# Patient Record
Sex: FEMALE | Race: WHITE | NOT HISPANIC OR LATINO | Employment: OTHER | URBAN - NONMETROPOLITAN AREA
[De-identification: names, ages, dates, MRNs, and addresses within clinical notes are randomized per-mention and may not be internally consistent; named-entity substitution may affect disease eponyms.]

---

## 2017-03-08 ENCOUNTER — LAB REQUISITION (OUTPATIENT)
Dept: LAB | Facility: HOSPITAL | Age: 62
End: 2017-03-08

## 2017-03-08 DIAGNOSIS — Z00.00 ENCOUNTER FOR GENERAL ADULT MEDICAL EXAMINATION WITHOUT ABNORMAL FINDINGS: ICD-10-CM

## 2017-03-08 PROCEDURE — 87086 URINE CULTURE/COLONY COUNT: CPT | Performed by: INTERNAL MEDICINE

## 2017-03-10 ENCOUNTER — HOSPITAL ENCOUNTER (OUTPATIENT)
Dept: GENERAL RADIOLOGY | Age: 62
Discharge: HOME | End: 2017-03-10
Payer: COMMERCIAL

## 2017-03-10 DIAGNOSIS — R10.84 ABDOMINAL PAIN, COLICKY: ICD-10-CM

## 2017-03-10 LAB — BACTERIA SPEC AEROBE CULT: ABNORMAL

## 2017-03-10 PROCEDURE — 74000 XR ABDOMEN LIMITED (KUB): CPT

## 2017-07-14 RX ORDER — LEVOTHYROXINE SODIUM 0.07 MG/1
75 TABLET ORAL DAILY
Qty: 90 TABLET | Refills: 3 | Status: SHIPPED | OUTPATIENT
Start: 2017-07-14 | End: 2017-12-08 | Stop reason: DRUGHIGH

## 2017-08-28 DIAGNOSIS — E55.9 AVITAMINOSIS D: ICD-10-CM

## 2017-08-28 DIAGNOSIS — E03.9 ACQUIRED HYPOTHYROIDISM: ICD-10-CM

## 2017-08-28 DIAGNOSIS — E78.2 MIXED HYPERLIPIDEMIA: Primary | ICD-10-CM

## 2017-09-27 DIAGNOSIS — E78.2 MIXED HYPERLIPIDEMIA: ICD-10-CM

## 2017-09-27 DIAGNOSIS — E55.9 AVITAMINOSIS D: ICD-10-CM

## 2017-09-27 DIAGNOSIS — E03.9 ACQUIRED HYPOTHYROIDISM: ICD-10-CM

## 2017-09-27 LAB
ALBUMIN SERPL-MCNC: 4.3 G/DL (ref 3.5–5.2)
ALP BLD-CCNC: 85 U/L (ref 35–104)
ALT SERPL-CCNC: 16 U/L (ref 5–33)
ANION GAP SERPL CALCULATED.3IONS-SCNC: 14 MMOL/L (ref 7–19)
AST SERPL-CCNC: 17 U/L (ref 5–32)
BILIRUB SERPL-MCNC: 0.5 MG/DL (ref 0.2–1.2)
BUN BLDV-MCNC: 17 MG/DL (ref 8–23)
CALCIUM SERPL-MCNC: 9.4 MG/DL (ref 8.8–10.2)
CHLORIDE BLD-SCNC: 106 MMOL/L (ref 98–111)
CHOLESTEROL, TOTAL: 212 MG/DL (ref 160–199)
CO2: 25 MMOL/L (ref 22–29)
CREAT SERPL-MCNC: 0.7 MG/DL (ref 0.5–0.9)
GFR NON-AFRICAN AMERICAN: >60
GLUCOSE BLD-MCNC: 82 MG/DL (ref 74–109)
HDLC SERPL-MCNC: 45 MG/DL (ref 65–121)
LDL CHOLESTEROL CALCULATED: 129 MG/DL
POTASSIUM SERPL-SCNC: 3.9 MMOL/L (ref 3.5–5)
SODIUM BLD-SCNC: 145 MMOL/L (ref 136–145)
TOTAL PROTEIN: 7.8 G/DL (ref 6.6–8.7)
TRIGL SERPL-MCNC: 188 MG/DL (ref 150–199)
TSH SERPL DL<=0.05 MIU/L-ACNC: 3.02 UIU/ML (ref 0.27–4.2)
VITAMIN D 25-HYDROXY: 38.5 NG/ML

## 2017-10-04 ENCOUNTER — OFFICE VISIT (OUTPATIENT)
Dept: FAMILY MEDICINE CLINIC | Age: 62
End: 2017-10-04
Payer: COMMERCIAL

## 2017-10-04 VITALS
DIASTOLIC BLOOD PRESSURE: 68 MMHG | WEIGHT: 173 LBS | RESPIRATION RATE: 18 BRPM | TEMPERATURE: 98.5 F | HEART RATE: 74 BPM | HEIGHT: 63 IN | OXYGEN SATURATION: 98 % | SYSTOLIC BLOOD PRESSURE: 110 MMHG | BODY MASS INDEX: 30.65 KG/M2

## 2017-10-04 DIAGNOSIS — Z23 FLU VACCINE NEED: ICD-10-CM

## 2017-10-04 DIAGNOSIS — E66.09 NON MORBID OBESITY DUE TO EXCESS CALORIES: ICD-10-CM

## 2017-10-04 DIAGNOSIS — K58.2 IRRITABLE BOWEL SYNDROME WITH BOTH CONSTIPATION AND DIARRHEA: ICD-10-CM

## 2017-10-04 DIAGNOSIS — E55.9 AVITAMINOSIS D: ICD-10-CM

## 2017-10-04 DIAGNOSIS — F41.1 GAD (GENERALIZED ANXIETY DISORDER): ICD-10-CM

## 2017-10-04 DIAGNOSIS — E78.2 MIXED HYPERLIPIDEMIA: ICD-10-CM

## 2017-10-04 DIAGNOSIS — M25.50 ARTHRALGIA, UNSPECIFIED JOINT: ICD-10-CM

## 2017-10-04 DIAGNOSIS — M79.10 MYALGIA: ICD-10-CM

## 2017-10-04 DIAGNOSIS — E03.9 ACQUIRED HYPOTHYROIDISM: Primary | ICD-10-CM

## 2017-10-04 PROCEDURE — 99214 OFFICE O/P EST MOD 30 MIN: CPT | Performed by: INTERNAL MEDICINE

## 2017-10-04 PROCEDURE — 90471 IMMUNIZATION ADMIN: CPT | Performed by: INTERNAL MEDICINE

## 2017-10-04 PROCEDURE — 90630 INFLUENZA, QUADV, 18-64 YRS, ID, PF, MICRO INJ, 0.1ML (FLUZONE QUADV, PF): CPT | Performed by: INTERNAL MEDICINE

## 2017-10-04 RX ORDER — DULOXETIN HYDROCHLORIDE 20 MG/1
20 CAPSULE, DELAYED RELEASE ORAL DAILY
Qty: 30 CAPSULE | Refills: 2 | Status: SHIPPED | OUTPATIENT
Start: 2017-10-04 | End: 2017-12-08

## 2017-10-04 RX ORDER — CHOLECALCIFEROL (VITAMIN D3) 1250 MCG
50000 CAPSULE ORAL WEEKLY
COMMUNITY
End: 2018-03-08 | Stop reason: SDUPTHER

## 2017-10-04 RX ORDER — NAPROXEN 500 MG/1
500 TABLET ORAL 2 TIMES DAILY WITH MEALS
COMMUNITY
End: 2017-12-08 | Stop reason: ALTCHOICE

## 2017-10-04 ASSESSMENT — ENCOUNTER SYMPTOMS
SORE THROAT: 0
BLOOD IN STOOL: 0
EYE REDNESS: 0
SINUS PRESSURE: 0
SHORTNESS OF BREATH: 0
COLOR CHANGE: 0
VOICE CHANGE: 0
DIARRHEA: 0
WHEEZING: 0
COUGH: 0
ROS SKIN COMMENTS: SEE HPI
EYE PAIN: 0
VOMITING: 0
ABDOMINAL PAIN: 0
EYE DISCHARGE: 0
RHINORRHEA: 0
CONSTIPATION: 1
CHEST TIGHTNESS: 0

## 2017-10-04 NOTE — PROGRESS NOTES
After obtaining consent, and per orders of Dr. Mela Kirby, injection of Influenza given in Left arm by [place]. Patient instructed to remain in clinic for 20 minutes afterwards, and to report any adverse reaction to me immediately.

## 2017-10-04 NOTE — PROGRESS NOTES
Negative for cold intolerance and polydipsia. Genitourinary: Negative for dysuria and hematuria. Musculoskeletal: Positive for arthralgias and myalgias. Negative for neck pain and neck stiffness. Skin: Positive for rash. Negative for color change. See HPI   Neurological: Negative for dizziness, weakness, numbness and headaches. Hematological: Negative for adenopathy. Psychiatric/Behavioral: Negative for confusion and sleep disturbance. Past Medical History:   Diagnosis Date    Acquired hypothyroidism     Allergic rhinitis     Alopecia     Avitaminosis D     Cervical radiculopathy     Gastritis     Idiopathic insomnia     Irritable bowel syndrome with both constipation and diarrhea     Mixed hyperlipidemia     Trochanteric bursitis of both hips     Venous insufficiency        Current Outpatient Prescriptions   Medication Sig Dispense Refill    naproxen (NAPROSYN) 500 MG tablet Take 500 mg by mouth 2 times daily (with meals)      Probiotic Product (PROBIOTIC DAILY PO) Take by mouth      Flaxseed, Linseed, (FLAX SEEDS PO) Take by mouth      Cholecalciferol (VITAMIN D3) 60120 units CAPS Take 10,000 each by mouth       DULoxetine (CYMBALTA) 20 MG extended release capsule Take 1 capsule by mouth daily 30 capsule 2    levothyroxine (SYNTHROID) 75 MCG tablet Take 1 tablet by mouth Daily 90 tablet 3     No current facility-administered medications for this visit.         No Known Allergies    Past Surgical History:   Procedure Laterality Date    ARTHRODESIS      ARTHROPLASTY      CHOLECYSTECTOMY      DILATION AND CURETTAGE OF UTERUS      HEMORRHOID SURGERY      HYSTERECTOMY      TUBAL LIGATION         Social History   Substance Use Topics    Smoking status: Never Smoker    Smokeless tobacco: None    Alcohol use No       Family History   Problem Relation Age of Onset    High Blood Pressure Mother        /68  Pulse 74  Temp 98.5 °F (36.9 °C) (Temporal)   Resp 18  Ht 5' 3\" (1.6 m)  Wt 173 lb (78.5 kg)  SpO2 98%  BMI 30.65 kg/m2    Physical Exam   Constitutional: She is oriented to person, place, and time. No distress. Eyes: Conjunctivae and EOM are normal. Pupils are equal, round, and reactive to light. Neck: Neck supple. No JVD present. No thyromegaly present. Cardiovascular: Normal rate, regular rhythm, normal heart sounds and intact distal pulses. Exam reveals no gallop and no friction rub. No murmur heard. Pulmonary/Chest: Effort normal and breath sounds normal.   Abdominal: Soft. Bowel sounds are normal. She exhibits no distension. There is no tenderness. Musculoskeletal: She exhibits no edema or tenderness. Lymphadenopathy:     She has no cervical adenopathy. Neurological: She is alert and oriented to person, place, and time. Skin: Skin is warm. No rash noted. Diffuse petechial rash BLE mid pre-tibial area to ankles BLE. Psychiatric: Her behavior is normal. Judgment and thought content normal. Her mood appears anxious. Lab Results   Component Value Date    CHOL 212 (H) 09/27/2017    TRIG 188 09/27/2017    HDL 45 (L) 09/27/2017    ALT 16 09/27/2017    AST 17 09/27/2017     09/27/2017    K 3.9 09/27/2017     09/27/2017    CREATININE 0.7 09/27/2017    BUN 17 09/27/2017    CO2 25 09/27/2017    TSH 3.020 09/27/2017     Lab Results   Component Value Date    LDLCALC 129 09/27/2017     This SmartLink has not been configured with any valid records. Assessment:    ICD-10-CM ICD-9-CM    1. Acquired hypothyroidism E03.9 244.9    2. Mixed hyperlipidemia E78.2 272.2    3. Avitaminosis D E55.9 268.9    4. Irritable bowel syndrome with both constipation and diarrhea K58.2 564.1    5. Arthralgia, unspecified joint M25.50 719.40 External Referral To Rheumatology   6. Myalgia M79.1 729.1 External Referral To Rheumatology   7. BERTRAND (generalized anxiety disorder) F41.1 300.02 DULoxetine (CYMBALTA) 20 MG extended release capsule   8.  Flu vaccine need Z23 V04.81 INFLUENZA, QUADV, 18-64 YRS, ID, PF, MICRO INJ, 0.1ML (FLUZONE QUADV, PF)   9. Non morbid obesity due to excess calories E66.09 278.00        Plan:  Margret Erazo was seen today for 6 month follow-up. Diagnoses and all orders for this visit:    Acquired hypothyroidism    Mixed hyperlipidemia    Avitaminosis D    Irritable bowel syndrome with both constipation and diarrhea    Arthralgia, unspecified joint  -     External Referral To Rheumatology    Myalgia  -     External Referral To Rheumatology    BERTRAND (generalized anxiety disorder)  -     DULoxetine (CYMBALTA) 20 MG extended release capsule; Take 1 capsule by mouth daily    Flu vaccine need  -     INFLUENZA, QUADV, 18-64 YRS, ID, PF, MICRO INJ, 0.1ML (FLUZONE QUADV, PF)    Non morbid obesity due to excess calories     1. Labs reviewed with patient  2. Refills provided  3. Influenza vaccine updated today  4. Avitaminosis D is well controlled at this time on review of recent labs. Continue current vitamin D supplement. 5. Continue low-cholesterol diet and exercise for mixed hyperlipidemia and obesity which are improving  6. Discussed petechial rash on her bilateral lower extremities with Dr. Elvin Badillo with rheumatology to determine if she needed to have workup for a possible vasculitis that could be causing this rash. Given the rashes been present for at least 6 months, he feels this is less likely but given there is also a question of fibromyalgia versus other cause of diffuse achiness we will refer her to rheumatology for further lab work and evaluation. 7. Given she also has anxiety, will try duloxetine 20 mg once daily given this would help fibromyalgia pain or other chronic pain syndromes in some patients. 8. Follow up in 6 weeks to recheck me. Subsequent to contact office prior to appointment if she has further questions.     Orders Placed This Encounter   Procedures    INFLUENZA, QUADV, 18-64 YRS, ID, PF, MICRO INJ, 0.1ML (FLUZONE QUADV, PF)    External Referral To Rheumatology     Referral Priority:   Routine     Referral Type:   Consult for Advice and Opinion     Referral Reason:   Specialty Services Required     Requested Specialty:   Rheumatology     Number of Visits Requested:   1     Orders Placed This Encounter   Medications    DULoxetine (CYMBALTA) 20 MG extended release capsule     Sig: Take 1 capsule by mouth daily     Dispense:  30 capsule     Refill:  2     There are no discontinued medications. Patient Instructions        Fibromyalgia: Care Instructions  Your Care Instructions  Fibromyalgia is a painful condition that is not completely understood by medical experts. The cause of fibromyalgia is not known. It can make you feel tired and ache all over. It causes tender spots at specific points of the body that hurt only when you press on them. You may have trouble sleeping, as well as other symptoms. These problems can upset your work and home life. Symptoms tend to come and go, although they may never go away completely. Fibromyalgia does not harm your muscles, joints, or organs. Follow-up care is a key part of your treatment and safety. Be sure to make and go to all appointments, and call your doctor if you are having problems. It's also a good idea to know your test results and keep a list of the medicines you take. How can you care for yourself at home? · Exercise often. Walk, swim, or bike to help with pain and sleep problems and to make you feel better. · Try to get a good night's sleep. Go to bed and get up at the same time each day, whether you feel rested or not. Make sure you have a good mattress and pillow. · Reduce stress. Avoid things that cause you stress, if you can. If not, work at making them less stressful. Learn to use biofeedback, guided imagery, meditation, or other methods to relax. · Make healthy changes. Eat a balanced diet, quit smoking, and limit alcohol and caffeine.   · Use a heating pad set on low or take warm baths or showers for pain. Using cold packs for up to 20 minutes at a time can also relieve pain. Put a thin cloth between the cold pack and your skin. A gentle massage might help too. · Be safe with medicines. Take your medicines exactly as prescribed. Call your doctor if you think you are having a problem with your medicine. Your doctor may talk to you about taking antidepressant medicines. These medicines may improve sleep, relieve pain, and in some cases treat depression. · Learn about fibromyalgia. This makes coping easier. Then, take an active role in your treatment. · Think about joining a support group with others who have fibromyalgia to learn more and get support. When should you call for help? Watch closely for changes in your health, and be sure to contact your doctor if:  · You feel sad, helpless, or hopeless; lose interest in things you used to enjoy; or have other symptoms of depression. · Your fibromyalgia symptoms get worse. Where can you learn more? Go to [email or web address] and sign in to your Playchemy account. Enter V003 in the Dinglepharb box to learn more about \"Fibromyalgia: Care Instructions. \"     If you do not have an account, please click on the \"Sign Up Now\" link. Current as of: October 14, 2016  Content Version: 11.3  © 6221-3178 [employer]. Care instructions adapted under license by [facility] ([facility]). If you have questions about a medical condition or this instruction, always ask your healthcare professional. [place] [address] any warranty or liability for your use of this information. Patient voices understanding and agrees to plans along with risks and benefits of plan. Counseling:  Margret Galvez's case, medications and options were discussed in detail. Patient was instructed to call the office if she   questions regarding her treatment.  Should her conditions worsen, she should return to office to be reassessed by Dr. Mary Carmen Smith. she  Should to go the closest Emergency Department for any emergency. They verbalized understanding the above instructions. Return in about 6 weeks (around 11/15/2017) for recheck mood.

## 2017-10-04 NOTE — MR AVS SNAPSHOT
After Visit Summary             Hector Kumar   10/4/2017 2:00 PM   Office Visit    Description:  Female : 1955   Provider:  Sander Nicolas MD   Department:  [facility]              Your Follow-Up and Future Appointments         Below is a list of your follow-up and future appointments. This may not be a complete list as you may have made appointments directly with providers that we are not aware of or your providers may have made some for you. Please call your providers to confirm appointments. It is important to keep your appointments. Please bring your current insurance card, photo ID, co-pay, and all medication bottles to your appointment. If self-pay, payment is expected at the time of service. Your To-Do List     Future Appointments Provider Department Dept Phone    11/15/2017 4:00 PM Sander Nicolas MD [address] [phone]    Please arrive 15 minutes prior to appointment time, bring insurance card and photo ID. Follow-Up    Return in about 6 weeks (around 11/15/2017) for recheck mood. Information from Your Visit        Department     Name Address Phone Fax    [address] [address] [address] [phone]      You Were Seen for:         Comments    Acquired hypothyroidism   [705958]         Vital Signs     Blood Pressure Pulse Temperature Respirations Height Weight    110/68 74 98.5 °F (36.9 °C) (Temporal) 18 5' 3\" (1.6 m) 173 lb (78.5 kg)    Oxygen Saturation Body Mass Index Smoking Status             98% 30.65 kg/m2 Never Smoker         Additional Information about your Body Mass Index (BMI)           Your BMI as listed above is considered obese (30 or more). BMI is an estimate of body fat, calculated from your height and weight.   The higher your BMI, the greater your risk of heart disease, high blood pressure, type 2 diabetes, stroke, gallstones, arthritis, sleep apnea, and certain Refills:  2   Started by:  Kelby Garrett MD            Where to Get Your Medications      These medications were sent to [facility], [facility] [phone] - F [phone]  [address] [address]    Hours:  24-hours Phone:  [phone]     DULoxetine 20 MG extended release capsule               Your Current Medications Are              naproxen (NAPROSYN) 500 MG tablet Take 500 mg by mouth 2 times daily (with meals)    Probiotic Product (PROBIOTIC DAILY PO) Take by mouth    Flaxseed, Linseed, (FLAX SEEDS PO) Take by mouth    Cholecalciferol (VITAMIN D3) 27097 units CAPS Take 10,000 each by mouth     DULoxetine (CYMBALTA) 20 MG extended release capsule Take 1 capsule by mouth daily    levothyroxine (SYNTHROID) 75 MCG tablet Take 1 tablet by mouth Daily      Allergies           No Known Allergies      We Ordered/Performed the following           External Referral To Rheumatology     Comments: The patient can be scheduled with any member of the group, including the provider with the first available appointments. Additional Information        Basic Information     Date Of Birth Sex Race Ethnicity Preferred Language    1955 Female White Non-/Non  English      Problem List as of 10/4/2017  Date Reviewed: 10/4/2017                Acquired hypothyroidism    Mixed hyperlipidemia    Avitaminosis D    Irritable bowel syndrome with both constipation and diarrhea    Arthralgia    Myalgia    BERTRAND (generalized anxiety disorder)      Preventive Care        Date Due    Hepatitis C screening is recommended for all adults regardless of risk factors born between [facility] at least once (lifetime) who have never been tested. 1955    HIV screening is recommended for all people regardless of risk factors  aged 15-65 years at least once (lifetime) who have never been HIV tested.  8/27/1968    Tetanus Combination Vaccine (1 - Tdap) 8/27/1972 Pap Smear 8/27/1974    Mammograms are recommended every 2 years for low/average risk patients aged 48 - 69, and every year for high risk patients per updated national guidelines. However these guidelines can be individualized by your provider. 8/27/2003    Colonoscopy 8/27/2003    Zoster Vaccine 8/27/2013    Yearly Flu Vaccine (1) 9/1/2017    Cholesterol Screening 9/27/2022            MyChart Signup           [employer] allows you to send messages to your doctor, view your test results, renew your prescriptions, schedule appointments, view visit notes, and more. How Do I Sign Up? 1. In your Internet browser, go to [email or web address]  2. Click on the Sign Up Now link in the Sign In box. You will see the New Member Sign Up page. 3. Enter your [employer] Access Code exactly as it appears below. You will not need to use this code after youve completed the sign-up process. If you do not sign up before the expiration date, you must request a new code. [employer] Access Code: [email or web address]  Expires: 12/3/2017  3:18 PM    4. Enter your Social Security Number ([id number]) and Date of Birth (mm/dd/yyyy) as indicated and click Submit. You will be taken to the next sign-up page. 5. Create a [facility] ID. This will be your [employer] login ID and cannot be changed, so think of one that is secure and easy to remember. 6. Create a [employer] password. You can change your password at any time. 7. Enter your Password Reset Question and Answer. This can be used at a later time if you forget your password. 8. Enter your e-mail address. You will receive e-mail notification when new information is available in [address] 9. Click Sign Up. You can now view your medical record. Additional Information  If you have questions, please contact the physician practice where you receive care. Remember, [employer] is NOT to be used for urgent needs. For medical emergencies, dial 911.

## 2017-10-12 RX ORDER — ERGOCALCIFEROL 1.25 MG/1
CAPSULE ORAL
Qty: 24 CAPSULE | Refills: 2 | Status: SHIPPED | OUTPATIENT
Start: 2017-10-12 | End: 2017-12-08 | Stop reason: SDUPTHER

## 2017-10-13 ENCOUNTER — TELEPHONE (OUTPATIENT)
Dept: FAMILY MEDICINE CLINIC | Age: 62
End: 2017-10-13

## 2017-10-13 DIAGNOSIS — F41.9 ANXIETY: Primary | ICD-10-CM

## 2017-10-13 NOTE — TELEPHONE ENCOUNTER
Pt called and states you started her on Cymbalta as a trial dose. Pt is unable to tolerate the medication. Pt states she is unable to sleep very irritable and sick at her stomach. Pt advised Dr was out this week but I would send message and she would get a call back on Monday. Please advise.

## 2017-10-16 NOTE — TELEPHONE ENCOUNTER
Up all night no sleep first day trying it, nausea early morning and had a very bad day next day, she was dizzy, irritable the next day at work and in a fog has not taken since. Since not taking it she has been taking half benadryl at night to sleep and that works. She just wants to do this for now.

## 2017-10-17 NOTE — TELEPHONE ENCOUNTER
Left vm to pt that if she wanted she could try Buspar since she was not tolerating the Cymbalta.  I gave her the office number to call back if she is interested in trying this

## 2017-10-18 RX ORDER — BUSPIRONE HYDROCHLORIDE 5 MG/1
5 TABLET ORAL 3 TIMES DAILY PRN
Qty: 30 TABLET | Refills: 1 | Status: SHIPPED | OUTPATIENT
Start: 2017-10-18 | End: 2017-12-08

## 2017-12-07 ENCOUNTER — TELEPHONE (OUTPATIENT)
Dept: FAMILY MEDICINE CLINIC | Age: 62
End: 2017-12-07

## 2017-12-08 ENCOUNTER — OFFICE VISIT (OUTPATIENT)
Dept: FAMILY MEDICINE CLINIC | Age: 62
End: 2017-12-08
Payer: COMMERCIAL

## 2017-12-08 VITALS
RESPIRATION RATE: 16 BRPM | HEART RATE: 76 BPM | WEIGHT: 173 LBS | BODY MASS INDEX: 30.65 KG/M2 | HEIGHT: 63 IN | SYSTOLIC BLOOD PRESSURE: 136 MMHG | OXYGEN SATURATION: 97 % | DIASTOLIC BLOOD PRESSURE: 86 MMHG

## 2017-12-08 DIAGNOSIS — E03.9 ACQUIRED HYPOTHYROIDISM: Primary | ICD-10-CM

## 2017-12-08 DIAGNOSIS — F41.1 GAD (GENERALIZED ANXIETY DISORDER): ICD-10-CM

## 2017-12-08 DIAGNOSIS — M79.10 MYALGIA: ICD-10-CM

## 2017-12-08 PROCEDURE — 99213 OFFICE O/P EST LOW 20 MIN: CPT | Performed by: INTERNAL MEDICINE

## 2017-12-08 RX ORDER — LEVOTHYROXINE SODIUM 88 UG/1
88 TABLET ORAL DAILY
Qty: 30 TABLET | Refills: 3 | Status: SHIPPED | OUTPATIENT
Start: 2017-12-08 | End: 2018-01-26 | Stop reason: SDUPTHER

## 2017-12-08 ASSESSMENT — ENCOUNTER SYMPTOMS
RHINORRHEA: 0
SHORTNESS OF BREATH: 0
SORE THROAT: 0
VOMITING: 0
SINUS PRESSURE: 0
EYE REDNESS: 0
VOICE CHANGE: 0
EYE DISCHARGE: 0
CHEST TIGHTNESS: 0
DIARRHEA: 0
COUGH: 0
COLOR CHANGE: 0
BLOOD IN STOOL: 0
EYE PAIN: 0
ABDOMINAL PAIN: 0
WHEEZING: 0
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
## 2018-01-26 DIAGNOSIS — E03.9 ACQUIRED HYPOTHYROIDISM: ICD-10-CM

## 2018-01-26 RX ORDER — LEVOTHYROXINE SODIUM 88 UG/1
88 TABLET ORAL DAILY
Qty: 90 TABLET | Refills: 0 | Status: SHIPPED | OUTPATIENT
Start: 2018-01-26 | End: 2018-03-08 | Stop reason: SDUPTHER

## 2018-02-15 DIAGNOSIS — R21 RASH AND OTHER NONSPECIFIC SKIN ERUPTION: Primary | ICD-10-CM

## 2018-02-19 DIAGNOSIS — E03.9 ACQUIRED HYPOTHYROIDISM: ICD-10-CM

## 2018-02-19 LAB — TSH SERPL DL<=0.05 MIU/L-ACNC: 2 UIU/ML (ref 0.27–4.2)

## 2018-03-08 ENCOUNTER — OFFICE VISIT (OUTPATIENT)
Dept: FAMILY MEDICINE CLINIC | Age: 63
End: 2018-03-08
Payer: COMMERCIAL

## 2018-03-08 VITALS
HEART RATE: 72 BPM | SYSTOLIC BLOOD PRESSURE: 118 MMHG | WEIGHT: 178 LBS | DIASTOLIC BLOOD PRESSURE: 62 MMHG | BODY MASS INDEX: 31.54 KG/M2 | HEIGHT: 63 IN | OXYGEN SATURATION: 98 % | TEMPERATURE: 97.4 F

## 2018-03-08 DIAGNOSIS — K58.2 IRRITABLE BOWEL SYNDROME WITH BOTH CONSTIPATION AND DIARRHEA: ICD-10-CM

## 2018-03-08 DIAGNOSIS — F41.1 GAD (GENERALIZED ANXIETY DISORDER): ICD-10-CM

## 2018-03-08 DIAGNOSIS — E55.9 AVITAMINOSIS D: ICD-10-CM

## 2018-03-08 DIAGNOSIS — E66.09 CLASS 1 OBESITY DUE TO EXCESS CALORIES WITHOUT SERIOUS COMORBIDITY WITH BODY MASS INDEX (BMI) OF 32.0 TO 32.9 IN ADULT: ICD-10-CM

## 2018-03-08 DIAGNOSIS — E03.9 ACQUIRED HYPOTHYROIDISM: Primary | ICD-10-CM

## 2018-03-08 DIAGNOSIS — Z00.00 ANNUAL PHYSICAL EXAM: ICD-10-CM

## 2018-03-08 PROCEDURE — 99214 OFFICE O/P EST MOD 30 MIN: CPT | Performed by: INTERNAL MEDICINE

## 2018-03-08 RX ORDER — UREA 10 %
500 LOTION (ML) TOPICAL DAILY
COMMUNITY

## 2018-03-08 RX ORDER — CHOLECALCIFEROL (VITAMIN D3) 1250 MCG
50000 CAPSULE ORAL WEEKLY
Qty: 12 CAPSULE | Refills: 3 | Status: SHIPPED | OUTPATIENT
Start: 2018-03-08 | End: 2022-09-13

## 2018-03-08 RX ORDER — MULTIVIT WITH MINERALS/LUTEIN
1000 TABLET ORAL DAILY
COMMUNITY
End: 2022-09-13

## 2018-03-08 RX ORDER — LEVOTHYROXINE SODIUM 88 UG/1
88 TABLET ORAL DAILY
Qty: 90 TABLET | Refills: 3 | Status: SHIPPED | OUTPATIENT
Start: 2018-03-08 | End: 2018-04-30 | Stop reason: SDUPTHER

## 2018-03-08 ASSESSMENT — ENCOUNTER SYMPTOMS
COUGH: 0
EYE PAIN: 0
SORE THROAT: 0
EYE REDNESS: 0
SHORTNESS OF BREATH: 0
VOICE CHANGE: 0
RHINORRHEA: 0
DIARRHEA: 0
VOMITING: 0
WHEEZING: 0
BLOOD IN STOOL: 0
SINUS PRESSURE: 0
CHEST TIGHTNESS: 0
ABDOMINAL PAIN: 0
EYE DISCHARGE: 0
COLOR CHANGE: 0

## 2018-03-08 NOTE — PROGRESS NOTES
Gill Law is a 57 y.o. female who presents today for   Chief Complaint   Patient presents with    3 Month Follow-Up       HPI  59y/o WF here for f/u on acquired hypothyroidism , avitaminosis D, and BERTRAND. She is feeling much better over the past 3 mths. She has gained 5 lbs in the past 3 mths however but notes she is not exercising like she has been off/on in the past and she loves bread and pasta. She is the  of [employer] in [place] which she enjoys very much. IBS is much better with magnesium gluconate supplement and potassium replacement with OTC treatment which has really regulated her bowels and she is \"much less bloated and crampy. \" She is still taking her probiotic supplement daily also. She feels like her anxiety is well controlled without medication and she did not tolerate low dose duloxetine so she does not want to take anything else. She takes vit D 50,000 units weekly for avitaminosis D and last level in September was normal at 38.5. She has had her flu vaccine, Tdap, and shingles vaccine already. Hypothyroidism  Patient presents for evaluation of thyroid function. Symptoms consist of denies fatigue, weight changes, heat/cold intolerance, bowel/skin changes or CVS symptoms. Symptoms have present for several years. The symptoms are mild. The problem has been stable. Previous thyroid studies include TSH. Review of Systems   Constitutional: Negative for appetite change, chills, fatigue and fever. HENT: Negative for ear pain, rhinorrhea, sinus pressure, sore throat and voice change. Eyes: Negative for pain, discharge and redness. Respiratory: Negative for cough, chest tightness, shortness of breath and wheezing. Cardiovascular: Negative for chest pain and palpitations. Gastrointestinal: Negative for abdominal pain, blood in stool, diarrhea and vomiting. See HPI   Endocrine: Negative for cold intolerance and polydipsia.    Genitourinary: Negative for dysuria and hematuria. Musculoskeletal: Negative for arthralgias, myalgias, neck pain and neck stiffness. Skin: Negative for color change and rash. Neurological: Negative for dizziness and headaches. Hematological: Negative for adenopathy. Does not bruise/bleed easily. Psychiatric/Behavioral: Negative for confusion and sleep disturbance. Past Medical History:   Diagnosis Date    Acquired hypothyroidism     Allergic rhinitis     Alopecia     Avitaminosis D     Cervical radiculopathy     Gastritis     Idiopathic insomnia     Internal hemorrhoids     Irritable bowel syndrome with both constipation and diarrhea     Mixed hyperlipidemia     Trochanteric bursitis of both hips     Venous insufficiency        Current Outpatient Prescriptions   Medication Sig Dispense Refill    vitamin E 1000 units capsule Take 1,000 Units by mouth daily      Potassium 99 MG TABS Take by mouth      Magnesium Gluconate 500 (27 Mg) MG TABS tablet Take 500 mg by mouth daily      levothyroxine (SYNTHROID) 88 MCG tablet Take 1 tablet by mouth Daily 90 tablet 3    Cholecalciferol (VITAMIN D3) 96143 units CAPS Take 50,000 each by mouth once a week 12 capsule 3    Probiotic Product (PROBIOTIC DAILY PO) Take by mouth       No current facility-administered medications for this visit.         No Known Allergies    Past Surgical History:   Procedure Laterality Date    ARTHRODESIS      ARTHROPLASTY      CHOLECYSTECTOMY      COLONOSCOPY  09/12/2011    internal hemorrhoids    DILATION AND CURETTAGE OF UTERUS      HEMORRHOID SURGERY      HYSTERECTOMY      TUBAL LIGATION         Social History   Substance Use Topics    Smoking status: Never Smoker    Smokeless tobacco: Never Used    Alcohol use No       Family History   Problem Relation Age of Onset    High Blood Pressure Mother        /62 (Site: Right Arm)   Pulse 72   Temp 97.4 °F (36.3 °C) (Temporal)   Ht 5' 3\" (1.6 m)   Wt 178 lb (80.7 kg)   SpO2 98%   BMI 31.53 kg/m²     Physical Exam   Constitutional: She is oriented to person, place, and time. No distress. Eyes: Conjunctivae and EOM are normal. Pupils are equal, round, and reactive to light. Neck: Neck supple. No JVD present. No thyromegaly present. No carotid bruits   Cardiovascular: Normal rate, regular rhythm, normal heart sounds and intact distal pulses. Exam reveals no gallop and no friction rub. No murmur heard. Pulmonary/Chest: Effort normal and breath sounds normal.   Abdominal: Soft. Bowel sounds are normal. She exhibits no distension. There is no tenderness. Musculoskeletal: She exhibits no edema or tenderness. Lymphadenopathy:     She has no cervical adenopathy. Neurological: She is alert and oriented to person, place, and time. Skin: Skin is warm. No rash noted. Psychiatric: She has a normal mood and affect. Her behavior is normal. Judgment and thought content normal.         Lab Results   Component Value Date    TSH 2.000 02/19/2018     Lab Results   Component Value Date    VITD25 38.5 09/27/2017     Assessment:    ICD-10-CM ICD-9-CM    1. Acquired hypothyroidism E03.9 244.9 levothyroxine (SYNTHROID) 88 MCG tablet      TSH without Reflex   2. BERTRAND (generalized anxiety disorder) F41.1 300.02    3. Irritable bowel syndrome with both constipation and diarrhea K58.2 564.1    4. Avitaminosis D E55.9 268.9 Cholecalciferol (VITAMIN D3) 73948 units CAPS      Vitamin D 25 Hydroxy   5. Annual physical exam Z00.00 V70.0 CBC Auto Differential      Comprehensive Metabolic Panel      Lipid Panel   6. Class 1 obesity due to excess calories without serious comorbidity with body mass index (BMI) of 32.0 to 32.9 in adult E66.09 278.00     Z68.32 V85.32        Plan:  Oh Shrestha was seen today for 3 month follow-up. Diagnoses and all orders for this visit:    Acquired hypothyroidism  -     levothyroxine (SYNTHROID) 88 MCG tablet;  Take 1 tablet by mouth Daily  -     TSH without Reflex; Future    BERTRAND (generalized anxiety disorder)    Irritable bowel syndrome with both constipation and diarrhea    Avitaminosis D  -     Cholecalciferol (VITAMIN D3) 47656 units CAPS; Take 50,000 each by mouth once a week  -     Vitamin D 25 Hydroxy; Future    Annual physical exam  -     CBC Auto Differential; Future  -     Comprehensive Metabolic Panel; Future  -     Lipid Panel; Future    Class 1 obesity due to excess calories without serious comorbidity with body mass index (BMI) of 32.0 to 32.9 in adult    1. Labs reviewed with patient  2. Refills provided  3. Continue vitamin D 50,000 units weekly for avitaminosis D which is well controlled currently. 4. Continue levothyroxine 88mcg daily for acquired hypothyroidism which is also well controlled. Repeat TSH prior to f/u in 6 mths  5. Stressed importance of increased efforts at low CHO diet, routine exercise, and weight loss to help with obesity and high cholesterol. 6. Continue current care for IBS which is improving  7.  ECPE and f/u in 6 mths with labs as ordered prior to appm    Orders Placed This Encounter   Procedures    CBC Auto Differential     Standing Status:   Future     Standing Expiration Date:   3/8/2019    Comprehensive Metabolic Panel     Standing Status:   Future     Standing Expiration Date:   3/8/2019    Lipid Panel     Standing Status:   Future     Standing Expiration Date:   3/8/2019     Order Specific Question:   Is Patient Fasting?/# of Hours     Answer:   yes/8 hrs    TSH without Reflex     Standing Status:   Future     Standing Expiration Date:   3/8/2019    Vitamin D 25 Hydroxy     Standing Status:   Future     Standing Expiration Date:   3/8/2019     Orders Placed This Encounter   Medications    levothyroxine (SYNTHROID) 88 MCG tablet     Sig: Take 1 tablet by mouth Daily     Dispense:  90 tablet     Refill:  3    Cholecalciferol (VITAMIN D3) 34389 units CAPS     Sig: Take 50,000 each by mouth once a week     Dispense:  12 capsule     Refill:  3     Medications Discontinued During This Encounter   Medication Reason    levothyroxine (SYNTHROID) 88 MCG tablet Reorder    Cholecalciferol (VITAMIN D3) 05776 units CAPS Reorder     There are no Patient Instructions on file for this visit. Patient voices understanding and agrees to plans along with risks and benefits of plan. Counseling:  Kenn Galvez's case, medications and options were discussed in detail. Patient was instructed to call the office if she   questions regarding her treatment. Should her conditions worsen, she should return to office to be reassessed by Dr. Suzan Lou. she  Should to go the closest Emergency Department for any emergency. They verbalized understanding the above instructions. Return in about 6 months (around 9/8/2018) for ECPE, high cholesterol, thyroid.

## 2018-04-28 DIAGNOSIS — E03.9 ACQUIRED HYPOTHYROIDISM: ICD-10-CM

## 2018-04-30 RX ORDER — LEVOTHYROXINE SODIUM 88 UG/1
TABLET ORAL
Qty: 90 TABLET | Refills: 1 | Status: SHIPPED | OUTPATIENT
Start: 2018-04-30 | End: 2022-08-29 | Stop reason: DRUGHIGH

## 2018-05-11 ENCOUNTER — TELEPHONE (OUTPATIENT)
Dept: FAMILY MEDICINE CLINIC | Age: 63
End: 2018-05-11

## 2018-05-11 DIAGNOSIS — G89.29 CHRONIC BILATERAL LOW BACK PAIN WITH SCIATICA, SCIATICA LATERALITY UNSPECIFIED: Primary | ICD-10-CM

## 2018-05-11 DIAGNOSIS — M54.40 CHRONIC BILATERAL LOW BACK PAIN WITH SCIATICA, SCIATICA LATERALITY UNSPECIFIED: Primary | ICD-10-CM

## 2018-05-17 ENCOUNTER — TELEPHONE (OUTPATIENT)
Dept: FAMILY MEDICINE CLINIC | Age: 63
End: 2018-05-17

## 2018-07-18 ENCOUNTER — TELEPHONE (OUTPATIENT)
Dept: FAMILY MEDICINE CLINIC | Age: 63
End: 2018-07-18

## 2018-08-19 RX ORDER — NAPROXEN 500 MG/1
TABLET ORAL
Qty: 180 TABLET | Refills: 1 | Status: SHIPPED | OUTPATIENT
Start: 2018-08-19 | End: 2022-09-13

## 2018-08-30 DIAGNOSIS — Z00.00 ANNUAL PHYSICAL EXAM: ICD-10-CM

## 2018-08-30 DIAGNOSIS — E03.9 ACQUIRED HYPOTHYROIDISM: ICD-10-CM

## 2018-08-30 DIAGNOSIS — E55.9 AVITAMINOSIS D: ICD-10-CM

## 2018-08-30 LAB
ALBUMIN SERPL-MCNC: 4.3 G/DL (ref 3.5–5.2)
ALP BLD-CCNC: 84 U/L (ref 35–104)
ALT SERPL-CCNC: 20 U/L (ref 5–33)
ANION GAP SERPL CALCULATED.3IONS-SCNC: 15 MMOL/L (ref 7–19)
AST SERPL-CCNC: 20 U/L (ref 5–32)
BASOPHILS ABSOLUTE: 0 K/UL (ref 0–0.2)
BASOPHILS RELATIVE PERCENT: 0.4 % (ref 0–1)
BILIRUB SERPL-MCNC: 0.4 MG/DL (ref 0.2–1.2)
BUN BLDV-MCNC: 13 MG/DL (ref 8–23)
CALCIUM SERPL-MCNC: 9.2 MG/DL (ref 8.8–10.2)
CHLORIDE BLD-SCNC: 103 MMOL/L (ref 98–111)
CHOLESTEROL, TOTAL: 234 MG/DL (ref 160–199)
CO2: 26 MMOL/L (ref 22–29)
CREAT SERPL-MCNC: 0.6 MG/DL (ref 0.5–0.9)
EOSINOPHILS ABSOLUTE: 0.1 K/UL (ref 0–0.6)
EOSINOPHILS RELATIVE PERCENT: 1.7 % (ref 0–5)
GFR NON-AFRICAN AMERICAN: >60
GLUCOSE BLD-MCNC: 86 MG/DL (ref 74–109)
HCT VFR BLD CALC: 42.2 % (ref 37–47)
HDLC SERPL-MCNC: 54 MG/DL (ref 65–121)
HEMOGLOBIN: 13.7 G/DL (ref 12–16)
LDL CHOLESTEROL CALCULATED: 138 MG/DL
LYMPHOCYTES ABSOLUTE: 1.8 K/UL (ref 1.1–4.5)
LYMPHOCYTES RELATIVE PERCENT: 34.4 % (ref 20–40)
MCH RBC QN AUTO: 29.6 PG (ref 27–31)
MCHC RBC AUTO-ENTMCNC: 32.5 G/DL (ref 33–37)
MCV RBC AUTO: 91.1 FL (ref 81–99)
MONOCYTES ABSOLUTE: 0.5 K/UL (ref 0–0.9)
MONOCYTES RELATIVE PERCENT: 8.8 % (ref 0–10)
NEUTROPHILS ABSOLUTE: 2.9 K/UL (ref 1.5–7.5)
NEUTROPHILS RELATIVE PERCENT: 54.5 % (ref 50–65)
PDW BLD-RTO: 12.9 % (ref 11.5–14.5)
PLATELET # BLD: 207 K/UL (ref 130–400)
PMV BLD AUTO: 10 FL (ref 9.4–12.3)
POTASSIUM SERPL-SCNC: 4 MMOL/L (ref 3.5–5)
RBC # BLD: 4.63 M/UL (ref 4.2–5.4)
SODIUM BLD-SCNC: 144 MMOL/L (ref 136–145)
TOTAL PROTEIN: 7.7 G/DL (ref 6.6–8.7)
TRIGL SERPL-MCNC: 209 MG/DL (ref 0–149)
TSH SERPL DL<=0.05 MIU/L-ACNC: 1.92 UIU/ML (ref 0.27–4.2)
VITAMIN D 25-HYDROXY: 43.9 NG/ML
WBC # BLD: 5.2 K/UL (ref 4.8–10.8)

## 2018-09-13 ENCOUNTER — OFFICE VISIT (OUTPATIENT)
Dept: FAMILY MEDICINE CLINIC | Age: 63
End: 2018-09-13
Payer: COMMERCIAL

## 2018-09-13 VITALS
BODY MASS INDEX: 31.8 KG/M2 | TEMPERATURE: 97.6 F | SYSTOLIC BLOOD PRESSURE: 126 MMHG | OXYGEN SATURATION: 98 % | HEIGHT: 63 IN | DIASTOLIC BLOOD PRESSURE: 82 MMHG | HEART RATE: 69 BPM | WEIGHT: 179.5 LBS

## 2018-09-13 DIAGNOSIS — E78.2 MIXED HYPERLIPIDEMIA: ICD-10-CM

## 2018-09-13 DIAGNOSIS — F41.1 GAD (GENERALIZED ANXIETY DISORDER): ICD-10-CM

## 2018-09-13 DIAGNOSIS — E55.9 AVITAMINOSIS D: ICD-10-CM

## 2018-09-13 DIAGNOSIS — G89.29 CHRONIC MIDLINE LOW BACK PAIN WITH SCIATICA, SCIATICA LATERALITY UNSPECIFIED: ICD-10-CM

## 2018-09-13 DIAGNOSIS — Z23 NEED FOR INFLUENZA VACCINATION: ICD-10-CM

## 2018-09-13 DIAGNOSIS — M79.10 MYALGIA: ICD-10-CM

## 2018-09-13 DIAGNOSIS — Z00.00 ANNUAL PHYSICAL EXAM: Primary | ICD-10-CM

## 2018-09-13 DIAGNOSIS — M54.40 CHRONIC MIDLINE LOW BACK PAIN WITH SCIATICA, SCIATICA LATERALITY UNSPECIFIED: ICD-10-CM

## 2018-09-13 DIAGNOSIS — Z23 NEED FOR PROPHYLACTIC VACCINATION AGAINST STREPTOCOCCUS PNEUMONIAE (PNEUMOCOCCUS): ICD-10-CM

## 2018-09-13 DIAGNOSIS — E03.9 ACQUIRED HYPOTHYROIDISM: ICD-10-CM

## 2018-09-13 DIAGNOSIS — M79.7 FIBROMYALGIA: ICD-10-CM

## 2018-09-13 DIAGNOSIS — E66.09 CLASS 1 OBESITY DUE TO EXCESS CALORIES WITHOUT SERIOUS COMORBIDITY WITH BODY MASS INDEX (BMI) OF 31.0 TO 31.9 IN ADULT: ICD-10-CM

## 2018-09-13 PROCEDURE — G8427 DOCREV CUR MEDS BY ELIG CLIN: HCPCS | Performed by: INTERNAL MEDICINE

## 2018-09-13 PROCEDURE — 1123F ACP DISCUSS/DSCN MKR DOCD: CPT | Performed by: INTERNAL MEDICINE

## 2018-09-13 PROCEDURE — G8417 CALC BMI ABV UP PARAM F/U: HCPCS | Performed by: INTERNAL MEDICINE

## 2018-09-13 PROCEDURE — G8400 PT W/DXA NO RESULTS DOC: HCPCS | Performed by: INTERNAL MEDICINE

## 2018-09-13 PROCEDURE — 4040F PNEUMOC VAC/ADMIN/RCVD: CPT | Performed by: INTERNAL MEDICINE

## 2018-09-13 PROCEDURE — 3014F SCREEN MAMMO DOC REV: CPT | Performed by: INTERNAL MEDICINE

## 2018-09-13 PROCEDURE — 1090F PRES/ABSN URINE INCON ASSESS: CPT | Performed by: INTERNAL MEDICINE

## 2018-09-13 PROCEDURE — 99397 PER PM REEVAL EST PAT 65+ YR: CPT | Performed by: INTERNAL MEDICINE

## 2018-09-13 PROCEDURE — 1036F TOBACCO NON-USER: CPT | Performed by: INTERNAL MEDICINE

## 2018-09-13 PROCEDURE — 3017F COLORECTAL CA SCREEN DOC REV: CPT | Performed by: INTERNAL MEDICINE

## 2018-09-13 PROCEDURE — 90472 IMMUNIZATION ADMIN EACH ADD: CPT | Performed by: INTERNAL MEDICINE

## 2018-09-13 PROCEDURE — 90670 PCV13 VACCINE IM: CPT | Performed by: INTERNAL MEDICINE

## 2018-09-13 PROCEDURE — 1101F PT FALLS ASSESS-DOCD LE1/YR: CPT | Performed by: INTERNAL MEDICINE

## 2018-09-13 PROCEDURE — 99214 OFFICE O/P EST MOD 30 MIN: CPT | Performed by: INTERNAL MEDICINE

## 2018-09-13 PROCEDURE — 90471 IMMUNIZATION ADMIN: CPT | Performed by: INTERNAL MEDICINE

## 2018-09-13 PROCEDURE — 90662 IIV NO PRSV INCREASED AG IM: CPT | Performed by: INTERNAL MEDICINE

## 2018-09-13 ASSESSMENT — ENCOUNTER SYMPTOMS
COLOR CHANGE: 0
VOICE CHANGE: 0
EYE DISCHARGE: 0
SHORTNESS OF BREATH: 0
VOMITING: 0
EYE REDNESS: 0
DIARRHEA: 0
SINUS PRESSURE: 0
RHINORRHEA: 0
BLOOD IN STOOL: 0
COUGH: 0
ABDOMINAL PAIN: 0
CHEST TIGHTNESS: 0
WHEEZING: 0
SORE THROAT: 0
EYE PAIN: 0

## 2018-09-13 ASSESSMENT — PATIENT HEALTH QUESTIONNAIRE - PHQ9
SUM OF ALL RESPONSES TO PHQ9 QUESTIONS 1 & 2: 0
SUM OF ALL RESPONSES TO PHQ QUESTIONS 1-9: 0
1. LITTLE INTEREST OR PLEASURE IN DOING THINGS: 0
2. FEELING DOWN, DEPRESSED OR HOPELESS: 0
SUM OF ALL RESPONSES TO PHQ QUESTIONS 1-9: 0

## 2018-09-13 NOTE — PROGRESS NOTES
Internal hemorrhoids     Irritable bowel syndrome with both constipation and diarrhea     Trochanteric bursitis of both hips     Venous insufficiency        Current Outpatient Prescriptions   Medication Sig Dispense Refill    naproxen (NAPROSYN) 500 MG tablet TAKE 1 TABLET EVERY 12     HOURS AS NEEDED 180 tablet 1    levothyroxine (SYNTHROID) 88 MCG tablet TAKE 1 TABLET BY MOUTH EVERY DAY 90 tablet 1    vitamin E 1000 units capsule Take 1,000 Units by mouth daily      Potassium 99 MG TABS Take by mouth      Magnesium Gluconate 500 (27 Mg) MG TABS tablet Take 500 mg by mouth daily      Cholecalciferol (VITAMIN D3) 54378 units CAPS Take 50,000 each by mouth once a week 12 capsule 3    Probiotic Product (PROBIOTIC DAILY PO) Take by mouth       No current facility-administered medications for this visit. No Known Allergies    Past Surgical History:   Procedure Laterality Date    ARTHRODESIS  2012    C1-C2    CHOLECYSTECTOMY      COLONOSCOPY  09/12/2011    internal hemorrhoids    DILATION AND CURETTAGE OF UTERUS      HEMORRHOID SURGERY      HYSTERECTOMY      ovaries remain, age 32 yrs old    TEMPOROMANDIBULAR JOINT SURGERY Left     1990s [address]         Social History   Substance Use Topics    Smoking status: Never Smoker    Smokeless tobacco: Never Used    Alcohol use No       Family History   Problem Relation Age of Onset    High Blood Pressure Mother        /82   Pulse 69   Temp 97.6 °F (36.4 °C) (Temporal)   Ht 5' 3\" (1.6 m)   Wt 179 lb 8 oz (81.4 kg)   SpO2 98%   BMI 31.80 kg/m²     Physical Exam   Constitutional: She is oriented to person, place, and time. She appears well-developed and well-nourished. HENT:   Head: Normocephalic and atraumatic. Right Ear: External ear normal.   Left Ear: External ear normal.   Nose: Nose normal.   Mouth/Throat: Oropharynx is clear and moist.   Eyes: Pupils are equal, round, and reactive to light.  Conjunctivae and EOM are normal. No scleral icterus. Neck: Normal range of motion. Neck supple. No JVD present. Carotid bruit is not present. No thyromegaly present. Cardiovascular: Normal rate, regular rhythm, normal heart sounds and intact distal pulses. Exam reveals no gallop and no friction rub. No murmur heard. Pulmonary/Chest: Effort normal and breath sounds normal. No respiratory distress. Abdominal: Soft. Bowel sounds are normal. She exhibits no distension and no mass. There is no hepatosplenomegaly. There is no tenderness. Genitourinary: No breast swelling, tenderness or discharge. Genitourinary Comments: deferred   Musculoskeletal: Normal range of motion. She exhibits no edema. Lymphadenopathy:     She has no cervical adenopathy. Right: No supraclavicular adenopathy present. Left: No supraclavicular adenopathy present. Neurological: She is alert and oriented to person, place, and time. She has normal strength and normal reflexes. No cranial nerve deficit or sensory deficit. Coordination normal.   Reflex Scores:       Brachioradialis reflexes are 2+ on the right side and 2+ on the left side. Patellar reflexes are 2+ on the right side and 2+ on the left side. Skin: Skin is warm and dry. No rash noted. Psychiatric: She has a normal mood and affect. Her speech is normal and behavior is normal. Judgment and thought content normal. Cognition and memory are normal.   Vitals reviewed. Breast: No dimpling, puckering, nodules, or masses appreciated when sitting or supine. No unusual breast tenderness. No nipple discharge.     Lab Results   Component Value Date    WBC 5.2 08/30/2018    HGB 13.7 08/30/2018    HCT 42.2 08/30/2018    MCV 91.1 08/30/2018     08/30/2018    LYMPHOPCT 34.4 08/30/2018    RBC 4.63 08/30/2018    MCH 29.6 08/30/2018    MCHC 32.5 (L) 08/30/2018    RDW 12.9 08/30/2018     Lab Results   Component Value Date     08/30/2018    K 4.0 08/30/2018     08/30/2018 adult    Fibromyalgia  -     [facility] [id number] MD [address], [place], [place]    Chronic midline low back pain with sciatica, sciatica laterality unspecified  -     [address] Celso Figueroa MD    Need for influenza vaccination  -     INFLUENZA, HIGH DOSE, 65 YRS +, IM, PF, PREFILL SYR, 0.5ML (FLUZONE HD)    Need for prophylactic vaccination against Streptococcus pneumoniae (pneumococcus)  -     Pneumococcal conjugate vaccine 13-valent PCV13    Problem Based Plannin. Labs reviewed with patient  2. Refills provided  3. Acquired hypothyroidism and avitaminosis D well controlled at this time. No medication changes today. 4. Discussed low CHO/low cholesterol diet, need for routine exercise, and weight loss to help with hyperlipidemia and obesity. 5. Referral to [facility] for evaluation of chronic LBP and possible fibromyalgia and to help with treatment. HM Based Plannin. Vaccines updated today. 7. Breast exam was benign. Screening mammogram up to date from 2017.  8. Pap smear not clinically indicated due to age and no hx of abnormal pap smears. 9. Colon cancer screening discussed with need for repeat screening cscope or if she will not do colonoscopy now, patient to consider cologuard vs FIT test.  10. Patient declines [facility] referral for BERTRAND which is exacerbated currently. Recommended exercise for stress reduction.    11. F/u in 4 mths with labs prior to appmt as ordered    Orders Placed This Encounter   Procedures    INFLUENZA, HIGH DOSE, 65 YRS +, IM, PF, PREFILL SYR, 0.5ML (FLUZONE HD)    Pneumococcal conjugate vaccine 13-valent PCV13    Comprehensive Metabolic Panel     Standing Status:   Future     Standing Expiration Date:   2019    Lipid Panel     Standing Status:   Future     Standing Expiration Date:   2019     Order Specific Question:   Is Patient Fasting?/# of Hours Answer:   yes/8 hrs    TSH without Reflex     Standing Status:   Future     Standing Expiration Date:   9/13/2019    Vitamin D 25 Hydroxy     Standing Status:   Future     Standing Expiration Date:   9/13/2019   05[address] Bailey Mendoza MD     Referral Priority:   Routine     Referral Type:   Eval and Treat     Referral Reason:   Specialty Services Required     Referred to Provider:   Lenin Hunt     Requested Specialty:   Rehabilitation     Number of Visits Requested:   1     No orders of the defined types were placed in this encounter. There are no discontinued medications. Patient Instructions       Patient Education        Well Visit, Women 50 to 72: Care Instructions  Your Care Instructions    Physical exams can help you stay healthy. Your doctor has checked your overall health and may have suggested ways to take good care of yourself. He or she also may have recommended tests. At home, you can help prevent illness with healthy eating, regular exercise, and other steps. Follow-up care is a key part of your treatment and safety. Be sure to make and go to all appointments, and call your doctor if you are having problems. It's also a good idea to know your test results and keep a list of the medicines you take. How can you care for yourself at home? · Reach and stay at a healthy weight. This will lower your risk for many problems, such as obesity, diabetes, heart disease, and high blood pressure. · Get at least 30 minutes of exercise on most days of the week. Walking is a good choice. You also may want to do other activities, such as running, swimming, cycling, or playing tennis or team sports. · Do not smoke. Smoking can make health problems worse. If you need help quitting, talk to your doctor about stop-smoking programs and medicines. These can increase your chances of quitting for good. · Protect your skin from too much sun.  When you're outdoors from 10 a.m. to 4 p.m., stay in the shade or cover up with clothing and a hat with a wide brim. Wear sunglasses that block UV rays. Even when it's cloudy, put broad-spectrum sunscreen (SPF 30 or higher) on any exposed skin. · See a dentist one or two times a year for checkups and to have your teeth cleaned. · Wear a seat belt in the car. · Limit alcohol to 1 drink a day. Too much alcohol can cause health problems. Follow your doctor's advice about when to have certain tests. These tests can spot problems early. · Cholesterol. Your doctor will tell you how often to have this done based on your age, family history, or other things that can increase your risk for heart attack and stroke. · Blood pressure. Have your blood pressure checked during a routine doctor visit. Your doctor will tell you how often to check your blood pressure based on your age, your blood pressure results, and other factors. · Mammogram. Ask your doctor how often you should have a mammogram, which is an X-ray of your breasts. A mammogram can spot breast cancer before it can be felt and when it is easiest to treat. · Pap test and pelvic exam. Ask your doctor how often you should have a Pap test. You may not need to have a Pap test as often as you used to. · Vision. Have your eyes checked every year or two or as often as your doctor suggests. Some experts recommend that you have yearly exams for glaucoma and other age-related eye problems starting at age 48. · Hearing. Tell your doctor if you notice any change in your hearing. You can have tests to find out how well you hear. · Diabetes. Ask your doctor whether you should have tests for diabetes. · Colon cancer. You should begin tests for colon cancer at age 48. You may have one of several tests. Your doctor will tell you how often to have tests based on your age and risk.  Risks include whether you already had a precancerous polyp removed from your colon or whether your parents, sisters and brothers, or children have had colon cancer. · Thyroid disease. Talk to your doctor about whether to have your thyroid checked as part of a regular physical exam. Women have an increased chance of a thyroid problem. · Osteoporosis. You should begin tests for bone density at age 72. If you are younger than 72, ask your doctor whether you have factors that may increase your risk for this disease. You may want to have this test before age 72. · Heart attack and stroke risk. At least every 4 to 6 years, you should have your risk for heart attack and stroke assessed. Your doctor uses factors such as your age, blood pressure, cholesterol, and whether you smoke or have diabetes to show what your risk for a heart attack or stroke is over the next 10 years. When should you call for help? Watch closely for changes in your health, and be sure to contact your doctor if you have any problems or symptoms that concern you. Where can you learn more? Go to https://IndiegogopeRawbotskatyeb.buildabrand. org and sign in to your [employer] account. Enter A014 in the [employer] box to learn more about \"Well Visit, Women 50 to 72: Care Instructions. \"     If you do not have an account, please click on the \"Sign Up Now\" link. Current as of: May 16, 2017  Content Version: 11.7  © 8733-2094 [employer]. Care instructions adapted under license by [facility] ([facility]). If you have questions about a medical condition or this instruction, always ask your healthcare professional. [place] [address] any warranty or liability for your use of this information. Patient Education        Cholesterol and Triglycerides Tests: About These Tests  What are they? Cholesterol and triglycerides tests measure the amount of fats in your blood. These fats have both \"good\" (HDL) and \"bad\" (LDL) cholesterol. Why are these tests done? These tests are done to help find out your risk of a heart attack and stroke.  They can help your doctor Fibromyalgia: Care Instructions  Your Care Instructions    Fibromyalgia is a painful condition that is not completely understood by medical experts. The cause of fibromyalgia is not known. It can make you feel tired and ache all over. It causes tender spots at specific points of the body that hurt only when you press on them. You may have trouble sleeping, as well as other symptoms. These problems can upset your work and home life. Symptoms tend to come and go, although they may never go away completely. Fibromyalgia does not harm your muscles, joints, or organs. Follow-up care is a key part of your treatment and safety. Be sure to make and go to all appointments, and call your doctor if you are having problems. It's also a good idea to know your test results and keep a list of the medicines you take. How can you care for yourself at home? · Exercise often. Walk, swim, or bike to help with pain and sleep problems and to make you feel better. · Try to get a good night's sleep. Go to bed and get up at the same time each day, whether you feel rested or not. Make sure you have a good mattress and pillow. · Reduce stress. Avoid things that cause you stress, if you can. If not, work at making them less stressful. Learn to use biofeedback, guided imagery, meditation, or other methods to relax. · Make healthy changes. Eat a balanced diet, quit smoking, and limit alcohol and caffeine. · Use a heating pad set on low or take warm baths or showers for pain. Using cold packs for up to 20 minutes at a time can also relieve pain. Put a thin cloth between the cold pack and your skin. A gentle massage might help too. · Be safe with medicines. Take your medicines exactly as prescribed. Call your doctor if you think you are having a problem with your medicine. Your doctor may talk to you about taking antidepressant medicines. These medicines may improve sleep, relieve pain, and in some cases treat depression.   · Learn about fibromyalgia. This makes coping easier. Then, take an active role in your treatment. · Think about joining a support group with others who have fibromyalgia to learn more and get support. When should you call for help? Watch closely for changes in your health, and be sure to contact your doctor if:    · You feel sad, helpless, or hopeless; lose interest in things you used to enjoy; or have other symptoms of depression.     · Your fibromyalgia symptoms get worse. Where can you learn more? Go to https://HouseTab.[employer]. org and sign in to your [employer] account. Enter V003 in the [employer] box to learn more about \"Fibromyalgia: Care Instructions. \"     If you do not have an account, please click on the \"Sign Up Now\" link. Current as of: October 9, 2017  Content Version: 11.7  © 9546-5447 [employer]. Care instructions adapted under license by [facility] ([facility]). If you have questions about a medical condition or this instruction, always ask your healthcare professional. [place] [address] any warranty or liability for your use of this information. Patient Education        Starting a Weight Loss Plan: Care Instructions  Your Care Instructions    If you are thinking about losing weight, it can be hard to know where to start. Your doctor can help you set up a weight loss plan that best meets your needs. You may want to take a class on nutrition or exercise, or join a weight loss support group. If you have questions about how to make changes to your eating or exercise habits, ask your doctor about seeing a registered dietitian or an exercise specialist.  It can be a big challenge to lose weight. But you do not have to make huge changes at once. Make small changes, and stick with them. When those changes become habit, add a few more changes. If you do not think you are ready to make changes right now, try to pick a date in the future.  Make an appointment to see your doctor to discuss whether the time is right for you to start a plan. Follow-up care is a key part of your treatment and safety. Be sure to make and go to all appointments, and call your doctor if you are having problems. It's also a good idea to know your test results and keep a list of the medicines you take. How can you care for yourself at home? · Set realistic goals. Many people expect to lose much more weight than is likely. A weight loss of 5% to 10% of your body weight may be enough to improve your health. · Get family and friends involved to provide support. Talk to them about why you are trying to lose weight, and ask them to help. They can help by participating in exercise and having meals with you, even if they may be eating something different. · Find what works best for you. If you do not have time or do not like to cook, a program that offers meal replacement bars or shakes may be better for you. Or if you like to prepare meals, finding a plan that includes daily menus and recipes may be best.  · Ask your doctor about other health professionals who can help you achieve your weight loss goals. ¨ A dietitian can help you make healthy changes in your diet. ¨ An exercise specialist or  can help you develop a safe and effective exercise program.  ¨ A counselor or psychiatrist can help you cope with issues such as depression, anxiety, or family problems that can make it hard to focus on weight loss. · Consider joining a support group for people who are trying to lose weight. Your doctor can suggest groups in your area. Where can you learn more? Go to https://sudeep.[employer]er[employer]. org and sign in to your [employer] account. Enter G808 in the [facility] box to learn more about \"Starting a Weight Loss Plan: Care Instructions. \"     If you do not have an account, please click on the \"Sign Up Now\" link.   Current as of: October 9, 2017  Content Version: 11.7  ©

## 2018-09-13 NOTE — PATIENT INSTRUCTIONS
smoke or have diabetes to show what your risk for a heart attack or stroke is over the next 10 years. When should you call for help? Watch closely for changes in your health, and be sure to contact your doctor if you have any problems or symptoms that concern you. Where can you learn more? Go to [email or web address] and sign in to your everyArt account. Enter Z691 in the [facility] box to learn more about \"Well Visit, Women 50 to 72: Care Instructions. \"     If you do not have an account, please click on the \"Sign Up Now\" link. Current as of: May 16, 2017  Content Version: 11.7  © 4783-2809 [employer]. Care instructions adapted under license by [facility] ([facility]). If you have questions about a medical condition or this instruction, always ask your healthcare professional. [address] any warranty or liability for your use of this information. Patient Education        Cholesterol and Triglycerides Tests: About These Tests  What are they? Cholesterol and triglycerides tests measure the amount of fats in your blood. These fats have both \"good\" (HDL) and \"bad\" (LDL) cholesterol. Why are these tests done? These tests are done to help find out your risk of a heart attack and stroke. They can help your doctor find out how well medicine is working for some health problems. How can you prepare for these tests? · Your doctor may tell you to fast before your tests. This means that you do not eat or drink anything except water for 9 to 12 hours before the tests. In most cases, you can take your medicines with water the morning of the test.  · Do not eat high-fat foods the night before the tests. · Do not drink alcohol or do intense exercise the night before the tests. · Be sure to tell your doctor about all the over-the-counter and prescription medicines and herbs or other supplements you take. They can affect the results of these tests.   What happens during these tests? A health professional takes a sample of your blood. What else should you know about these tests? Your cholesterol levels can help your doctor find out your risk for having a heart attack or stroke. But it's not just about your cholesterol. Your doctor uses your cholesterol levels plus other things to calculate your risk. These include:  · Your blood pressure. · Whether or not you have diabetes. · Your age, sex, and race. · Whether or not you smoke. You and your doctor can talk about whether you need to lower your risk and what treatment is best for you. Where can you learn more? Go to [email or web address] and sign in to your [employer] account. Enter R136 in the [employer] box to learn more about \"Cholesterol and Triglycerides Tests: About These Tests. \"     If you do not have an account, please click on the \"Sign Up Now\" link. Current as of: May 10, 2017  Content Version: 11.7  © 9556-6906 [employer] media. Care instructions adapted under license by [facility] ([facility]). If you have questions about a medical condition or this instruction, always ask your healthcare professional. [address] any warranty or liability for your use of this information. Patient Education        Fibromyalgia: Care Instructions  Your Care Instructions    Fibromyalgia is a painful condition that is not completely understood by medical experts. The cause of fibromyalgia is not known. It can make you feel tired and ache all over. It causes tender spots at specific points of the body that hurt only when you press on them. You may have trouble sleeping, as well as other symptoms. These problems can upset your work and home life. Symptoms tend to come and go, although they may never go away completely. Fibromyalgia does not harm your muscles, joints, or organs. Follow-up care is a key part of your treatment and safety.  Be sure to make and go to \"Fibromyalgia: Care Instructions. \"     If you do not have an account, please click on the \"Sign Up Now\" link. Current as of: October 9, 2017  Content Version: 11.7  © 8652-8906 [employer]. Care instructions adapted under license by [facility] [employer] [facility] [employer] [facility] ([facility]). If you have questions about a medical condition or this instruction, always ask your healthcare professional. [address] any warranty or liability for your use of this information. Patient Education        Starting a Weight Loss Plan: Care Instructions  Your Care Instructions    If you are thinking about losing weight, it can be hard to know where to start. Your doctor can help you set up a weight loss plan that best meets your needs. You may want to take a class on nutrition or exercise, or join a weight loss support group. If you have questions about how to make changes to your eating or exercise habits, ask your doctor about seeing a registered dietitian or an exercise specialist.  It can be a big challenge to lose weight. But you do not have to make huge changes at once. Make small changes, and stick with them. When those changes become habit, add a few more changes. If you do not think you are ready to make changes right now, try to pick a date in the future. Make an appointment to see your doctor to discuss whether the time is right for you to start a plan. Follow-up care is a key part of your treatment and safety. Be sure to make and go to all appointments, and call your doctor if you are having problems. It's also a good idea to know your test results and keep a list of the medicines you take. How can you care for yourself at home? · Set realistic goals. Many people expect to lose much more weight than is likely. A weight loss of 5% to 10% of your body weight may be enough to improve your health. · Get family and friends involved to provide support.  Talk to them about why you are trying to lose weight, and ask them to have an irregular heart rhythm. · Vitamin D may interact with other medicines. Tell your doctor about all of the medicines you take, including over-the-counter drugs, herbs, and pills. Tell your doctor about all of your current medical problems. Where can you learn more? Go to https://chpecasey.[email or web address] org and sign in to your CrowdTangle account. Enter [id number] in the [facility] box to learn more about \"Learning About Vitamin D. \"     If you do not have an account, please click on the \"Sign Up Now\" link. Current as of: May 12, 2017  Content Version: 11.7  © 3406-0078 [employer]. Care instructions adapted under license by [facility] ([facility]). If you have questions about a medical condition or this instruction, always ask your healthcare professional. [address] any warranty or liability for your use of this information.

## 2018-09-25 DIAGNOSIS — Z12.11 ENCOUNTER FOR FIT (FECAL IMMUNOCHEMICAL TEST) SCREENING: Primary | ICD-10-CM

## 2018-09-25 LAB
CONTROL: NORMAL
HEMOCCULT STL QL: NORMAL

## 2018-09-25 PROCEDURE — 82274 ASSAY TEST FOR BLOOD FECAL: CPT | Performed by: INTERNAL MEDICINE

## 2021-06-11 ENCOUNTER — TELEPHONE (OUTPATIENT)
Dept: FAMILY MEDICINE CLINIC | Age: 66
End: 2021-06-11

## 2021-06-11 NOTE — TELEPHONE ENCOUNTER
I called to schedule the patient an appointment. I was unable to leave a VM because the mailbox was full.

## 2022-08-23 ENCOUNTER — TELEPHONE (OUTPATIENT)
Dept: NEUROSURGERY | Age: 67
End: 2022-08-23

## 2022-08-23 NOTE — TELEPHONE ENCOUNTER
[facility] Neurosurgery New Patient Questionnaire    Diagnosis/Reason for Referral?    Sacroiliitis ,  not elsewhere classified, Low Back pain, unspecified. (SI Joint)    2. Who is completing questionnaire? Patient x Caregiver Family      3. Has the patient had any previous spinal/brain surgeries? yes        A. If yes, what is the name of the facility in which the surgery was performed? B. Procedure/Surgery performed? C5-C6 fusion, SI JOING     C. Who was the surgeon? DR. Laxmi Minor     D. When was the surgery? 2011, 2022       E. Did the patient improve after the surgery? 4. Is this a second opinion? If yes, Dr. Carmichael Letters would like to review patient first before making the appointment. 5. Have MRI Images been obtain within the last year? Yes X No      XR  CT     If yes, where was the imaging performed? [address] [place] [address]     If yes, what part of the body? Lumbar X Cervical  Thoracic  Brain     If yes, when was it obtained? 1/14/22    Note: if the scan was performed at a facility other than [facility], the disc will need to be brought to the appointment or we need to reach out to obtain the disc. A. Was the patient instructed to provide the disc? Yes  X No      8. Has the patient had a NCV/EMG within the last year? Yes  No X     If yes, where was it performed and date? MM/YY  Location:      9. Has the patient been to Physical Therapy? Yes  No     If yes, what location, how long attended, and last visit? Location:        Therapy Lasted:    Date of Last Visit:      10. Has the patient been to Pain Management? Yes X No     If yes, what location and last visit     Location: [facility]   Last Visit:   Is it helping?

## 2022-08-23 NOTE — TELEPHONE ENCOUNTER
Spoke with Babbitt Been to let her know that , I had Dr. Tre Finley review referral and patient will need to see Rita Lowry for appointment. I explained to patient that her referral was given to Dr. Rita Lowry and once he looks over it and makes a decision I would contact her to schedule appointment. I explained how Dr. Rita Lowry has to review since she had SI Joint surgery in less than a year. Patient voiced understanding, and we will also schedule her husbands appointment at the same time for him so see Desi Grounds. Patient thanked me for all my help. Will f/u with patient on 8/24/22.

## 2022-08-29 ENCOUNTER — HOSPITAL ENCOUNTER (OUTPATIENT)
Dept: GENERAL RADIOLOGY | Age: 67
Discharge: HOME | End: 2022-08-29
Payer: MEDICARE

## 2022-08-29 ENCOUNTER — OFFICE VISIT (OUTPATIENT)
Dept: NEUROSURGERY | Age: 67
End: 2022-08-29
Payer: MEDICARE

## 2022-08-29 VITALS
BODY MASS INDEX: 31.01 KG/M2 | WEIGHT: 175 LBS | HEIGHT: 63 IN | DIASTOLIC BLOOD PRESSURE: 92 MMHG | SYSTOLIC BLOOD PRESSURE: 156 MMHG | RESPIRATION RATE: 18 BRPM | HEART RATE: 75 BPM

## 2022-08-29 DIAGNOSIS — G89.29 CHRONIC BILATERAL LOW BACK PAIN WITH RIGHT-SIDED SCIATICA: ICD-10-CM

## 2022-08-29 DIAGNOSIS — M53.3 SACROILIAC JOINT DYSFUNCTION OF RIGHT SIDE: ICD-10-CM

## 2022-08-29 DIAGNOSIS — M54.41 CHRONIC BILATERAL LOW BACK PAIN WITH RIGHT-SIDED SCIATICA: ICD-10-CM

## 2022-08-29 DIAGNOSIS — M53.3 SACROILIAC JOINT DYSFUNCTION OF LEFT SIDE: ICD-10-CM

## 2022-08-29 DIAGNOSIS — M53.3 SACROILIAC JOINT DYSFUNCTION OF RIGHT SIDE: Primary | ICD-10-CM

## 2022-08-29 PROCEDURE — G8427 DOCREV CUR MEDS BY ELIG CLIN: HCPCS | Performed by: NEUROLOGICAL SURGERY

## 2022-08-29 PROCEDURE — G8400 PT W/DXA NO RESULTS DOC: HCPCS | Performed by: NEUROLOGICAL SURGERY

## 2022-08-29 PROCEDURE — 1090F PRES/ABSN URINE INCON ASSESS: CPT | Performed by: NEUROLOGICAL SURGERY

## 2022-08-29 PROCEDURE — 99204 OFFICE O/P NEW MOD 45 MIN: CPT | Performed by: NEUROLOGICAL SURGERY

## 2022-08-29 PROCEDURE — 1123F ACP DISCUSS/DSCN MKR DOCD: CPT | Performed by: NEUROLOGICAL SURGERY

## 2022-08-29 PROCEDURE — G8417 CALC BMI ABV UP PARAM F/U: HCPCS | Performed by: NEUROLOGICAL SURGERY

## 2022-08-29 PROCEDURE — 3017F COLORECTAL CA SCREEN DOC REV: CPT | Performed by: NEUROLOGICAL SURGERY

## 2022-08-29 PROCEDURE — 4004F PT TOBACCO SCREEN RCVD TLK: CPT | Performed by: NEUROLOGICAL SURGERY

## 2022-08-29 PROCEDURE — 73521 X-RAY EXAM HIPS BI 2 VIEWS: CPT

## 2022-08-29 RX ORDER — LEVOTHYROXINE SODIUM 0.05 MG/1
50 TABLET ORAL DAILY
COMMUNITY
Start: 2022-08-03

## 2022-08-29 RX ORDER — LISINOPRIL 20 MG/1
20 TABLET ORAL 2 TIMES DAILY
COMMUNITY
Start: 2022-07-21

## 2022-08-29 ASSESSMENT — ENCOUNTER SYMPTOMS
RESPIRATORY NEGATIVE: 1
GASTROINTESTINAL NEGATIVE: 1
EYES NEGATIVE: 1
BACK PAIN: 1

## 2022-08-29 NOTE — PROGRESS NOTES
[facility] Neurosurgery  Office Visit        Chief Complaint   Patient presents with    New Patient     Establishing care     Results     Imaging in Nucleus     Back Pain     Patient states she was told in the past that she needed an SI Joint fusion and had her left side done in 3/2022. Patient states her pain has gradually came on over the years. She states she is having muscle spasms in her legs. She is taking Tylenol to help manage the pain. Numbness     Patient states she does have numbness/tingling that comes and goes. HISTORY OF PRESENT ILLNESS:      The patient is a 69 y.o. female who presents primarily for neurosurgical evaluation of right hip and lower back pain. She has a longstanding history of back problems and underwent a left-sided SI fusion in 3/2022. At that time, she complained of severe bilateral lower back/buttock/hip pain with radiation to the anterior thighs and down her legs. She also reports an extensive course of non-operative treatment prior to surgery, including multiple bilateral SI joint injections as well as multiple courses of physical therapy. She is transferring her care because her prior surgeon has been placed on an indefinite medical leave-of-absence. She reports that her left-sided symptoms improved for several months after her immediate postoperative pain subsided, however for the past few months her left sided pain has returned. She also complains of severe pain in her right sacroiliac region. The pain is worsened by prolonged sitting and movement. She has difficulty bearing weight on one leg. She has not had any recent lumbar or pelvic imaging.         MEDICAL HISTORY:             Past Medical History:   Diagnosis Date    Allergic rhinitis     Alopecia     Cervical radiculopathy     Gastritis     IBS (irritable bowel syndrome)     Idiopathic insomnia     Internal hemorrhoids     Irritable bowel syndrome with both constipation and diarrhea     Seborrheic dermatitis of scalp     Trochanteric bursitis of both hips     Venous insufficiency        Past Surgical History:   Procedure Laterality Date    ARTHRODESIS  2012    C1-C2    CHOLECYSTECTOMY      COLONOSCOPY  09/12/2011    internal hemorrhoids    DILATION AND CURETTAGE OF UTERUS      HEMORRHOID SURGERY      HYSTERECTOMY (CERVIX STATUS UNKNOWN)      ovaries remain, age 32 yrs old    TEMPOROMANDIBULAR JOINT SURGERY Left     1990s    TUBAL LIGATION           Current Outpatient Medications:     levothyroxine (SYNTHROID) 50 MCG tablet, Take 50 mcg by mouth daily, Disp: , Rfl:     lisinopril (PRINIVIL;ZESTRIL) 20 MG tablet, Take 20 mg by mouth daily, Disp: , Rfl:     naproxen (NAPROSYN) 500 MG tablet, TAKE 1 TABLET EVERY 12     HOURS AS NEEDED, Disp: 180 tablet, Rfl: 1    vitamin E 1000 units capsule, Take 1,000 Units by mouth daily, Disp: , Rfl:     Potassium 99 MG TABS, Take by mouth, Disp: , Rfl:     Magnesium Gluconate 500 (27 Mg) MG TABS tablet, Take 500 mg by mouth daily, Disp: , Rfl:     Cholecalciferol (VITAMIN D3) 33494 units CAPS, Take 50,000 each by mouth once a week (Patient not taking: Reported on 8/29/2022), Disp: 12 capsule, Rfl: 3    Probiotic Product (PROBIOTIC DAILY PO), Take by mouth, Disp: , Rfl:     Allergies:  Patient has no known allergies. Social History:   Social History     Tobacco Use   Smoking Status Never   Smokeless Tobacco Never     Social History     Substance and Sexual Activity   Alcohol Use No         Family History:   Family History   Problem Relation Age of Onset    High Blood Pressure Mother        REVIEW OF SYSTEMS:    Constitutional: Negative. HENT: Negative. Eyes: Negative. Respiratory: Negative. Cardiovascular: Negative. Gastrointestinal: Negative. Genitourinary: Negative. Skin: Negative. Endo/Heme/Allergies: Negative. Psychiatric/Behavioral: Negative. Review of Systems was obtained by the medical assistant and reviewed by myself.     PHYSICAL EXAM:    Vitals:    08/29/22 1323   BP: (!) 156/92   Pulse: 75   Resp: 18       Constitutional: Appears well-developed and well-nourished. Eyes - conjunctiva normal.  Pupils react to light  Ear, nose,throat -Normal pinna and tragus, No scars, or lesions over external nose or ears, no obvious atrophy of tongue  Neck- symmetric, trachea midline, no jugular vein distension  Respiration- chest wall symmetric, normal effort without use of accessory muscles  Musculoskeletal - no significant wasting of muscles noted, no bony deformities, symmetric bulk  Extremities- no clubbing, cyanosis or edema  Skin - warm, dry, and intact. No rash,erythema, or pallor.   Psychiatric - mood, affect, and behavior appear normal.       NEUROLOGIC EXAM:    MENTAL STATUS: Alert, oriented, thought content appropriate    CRANIAL NERVES: PERRL, EOMI, symmetric facies, tongue midline    MOTOR:     Right Upper Extremity:    Deltoid: 5/5  Biceps: 5/5  Triceps: 5/5  Wrist Extension: 5/5  Finger Abduction: 5/5      Left Upper Extremity:    Deltoid: 5/5  Biceps: 5/5  Triceps: 5/5  Wrist Extension: 5/5  Finger Abduction: 5/5      Right Lower Extremity:    Hip Flexion: 5/5  Knee Extension: 5/5  Ankle Plantarflexion: 5/5  Ankle Dorsiflexion: 5/5        Left Lower Extremity:    Hip Flexion: 5/5  Knee Extension: 5/5  Ankle Plantarflexion: 5/5  Ankle Dorsiflexion: 5/5      SOMATOSENSORY:     Right Upper Extremity: normal light touch sensation  Left Upper Extremity: normal light touch sensation  Right Lower Extremity: normal light touch sensation  Left Lower Extremity: normal light touch sensation      REFLEXES:    Biceps: 2+  Patella: 2+  Ankle Jerk: 1+    Puentes's: Negative      COORDINATION and GAIT:    Gait: Antalgic      PROVOCATIVE TESTS:    Straight Leg Raise: Negative on the left, weakly positive on the right (SI pain and buttock pain)  Ahmet-Finger test: Positive bilaterally  SI palpation: Strongly positive bilaterally  LIGIA: Positive bilaterally, R>L  Thigh Thrust: Positive on the right, negative on the left  Distraction: Weakly positive (non-radiating pain at site of palpation)      IMAGING:    My interpretation of imaging studies:     X-rays of the cervical, thoracic and lumbar spine from 1/2021 reviewed. There is evidence of previous C4-6 ACDF. No malalignment. The plate is somewhat long and covers the C6/7 disc space, where there is significant DDD and osteophyte formation. X-rays of the thoracic spine reveal DDD and spondylosis with dextroscoliosis. X-rays of the lumbar spine reveal DDD and spondylosis with mild scoliosis. MRI of the lumbar spine from 7/2020 reviewed. There is mild scoliosis. There is multilevel DDD and facet arthropathy. There is no high-grade spinal canal or foraminal stenosis at any level in the lumbar spine. ASSESSMENT AND PLAN:    This is a 69 y.o. female who presents to establish neurosurgical care and discuss ongoing problems with bilateral sacroiliac joint dysfunction. She has undergone a left SI fusion in 3/2022 that initially provided some symptom relief but her symptoms are returning. She also complains of ongoing right sacroiliac pain. She also describes radicular pain in her right leg. She has no focal weakness or sensory loss on neurologic exam.  I explained that she will need updated lumbar and pelvic imaging to assess for compressive pathology in the lumbar spine and to assess whether she is fusing after her recent surgery on the left. I will plan to obtain hip and pelvic x-rays, a pelvic CT scan and an updated MRI scan. I will also refer her to pain management for a right sacroiliac joint injection (both for diagnostic and therapeutic purposes) I will also have her begin additional physical therapy targeting the right SI joint.   I will plan to follow up with her when the imaging studies are complete to review the findings and discuss her response to PT and the injection.             Dasha Trivedi MD

## 2022-09-07 ENCOUNTER — HOSPITAL ENCOUNTER (OUTPATIENT)
Dept: CT IMAGING | Age: 67
Discharge: HOME | End: 2022-09-07
Payer: MEDICARE

## 2022-09-07 ENCOUNTER — HOSPITAL ENCOUNTER (OUTPATIENT)
Dept: MRI IMAGING | Age: 67
Discharge: HOME | End: 2022-09-07
Payer: MEDICARE

## 2022-09-07 DIAGNOSIS — M53.3 SACROILIAC JOINT DYSFUNCTION OF LEFT SIDE: ICD-10-CM

## 2022-09-07 DIAGNOSIS — M54.41 CHRONIC BILATERAL LOW BACK PAIN WITH RIGHT-SIDED SCIATICA: ICD-10-CM

## 2022-09-07 DIAGNOSIS — G89.29 CHRONIC BILATERAL LOW BACK PAIN WITH RIGHT-SIDED SCIATICA: ICD-10-CM

## 2022-09-07 DIAGNOSIS — M53.3 SACROILIAC JOINT DYSFUNCTION OF RIGHT SIDE: ICD-10-CM

## 2022-09-07 PROCEDURE — 72192 CT PELVIS W/O DYE: CPT | Performed by: RADIOLOGY

## 2022-09-07 PROCEDURE — 72148 MRI LUMBAR SPINE W/O DYE: CPT | Performed by: RADIOLOGY

## 2022-09-07 PROCEDURE — 72192 CT PELVIS W/O DYE: CPT

## 2022-09-07 PROCEDURE — 72148 MRI LUMBAR SPINE W/O DYE: CPT

## 2022-09-12 ENCOUNTER — TELEPHONE (OUTPATIENT)
Dept: NEUROSURGERY | Age: 67
End: 2022-09-12

## 2022-09-12 NOTE — TELEPHONE ENCOUNTER
Patient called stating that Referring provider is now back in his practice and can take her case on again. She stated that she did not want to go back due to liking Dr Christel Berry and wanting her surgery to be done by him but had some questions. 1. Since she has medicare, what are the pre cert requirements for surgery? I stated that I will need to as Kelly Goodson SI bone rep, for sure the requirements. 2. Can she schedule a follow up with Dr Christel Berry to discuss surgery? I stated that she will need to wait until we find out what is needed first.     Upon calling Kelly Goodson at [phone], he stated that for her insurance she will only need two injections with notes stating 75% release. PT is not needed at first.     Called and spoke with pt, she VU and has PM appt tomorrow to discuss injections. She will call me back and let me know how PM appt goes.

## 2022-09-13 ENCOUNTER — HOSPITAL ENCOUNTER (OUTPATIENT)
Dept: PAIN MANAGEMENT | Age: 67
Discharge: HOME | End: 2022-09-13
Payer: MEDICARE

## 2022-09-13 VITALS
SYSTOLIC BLOOD PRESSURE: 176 MMHG | BODY MASS INDEX: 31.18 KG/M2 | OXYGEN SATURATION: 97 % | HEART RATE: 69 BPM | WEIGHT: 176 LBS | DIASTOLIC BLOOD PRESSURE: 87 MMHG | TEMPERATURE: 96.4 F | RESPIRATION RATE: 16 BRPM | HEIGHT: 63 IN

## 2022-09-13 DIAGNOSIS — M53.3 SACROILIAC JOINT DYSFUNCTION OF RIGHT SIDE: ICD-10-CM

## 2022-09-13 PROCEDURE — 99215 OFFICE O/P EST HI 40 MIN: CPT

## 2022-09-13 PROCEDURE — 99203 OFFICE O/P NEW LOW 30 MIN: CPT | Performed by: NURSE PRACTITIONER

## 2022-09-13 ASSESSMENT — PAIN DESCRIPTION - ORIENTATION: ORIENTATION: LOWER

## 2022-09-13 ASSESSMENT — PAIN DESCRIPTION - PAIN TYPE: TYPE: CHRONIC PAIN

## 2022-09-13 ASSESSMENT — PAIN SCALES - GENERAL: PAINLEVEL_OUTOF10: 6

## 2022-09-13 ASSESSMENT — PAIN DESCRIPTION - LOCATION: LOCATION: BACK

## 2022-09-13 NOTE — PROGRESS NOTES
Office contract reviewed with patient and signed per patient. No questions voiced at the present and patient states understanding of contract. Copy given to patient.

## 2022-09-13 NOTE — H&P
[facility] Physical and Pain Medicine    History and Physical    Patient Name: Tania Hidalgo    MR #: [id number]    Account #: [de-identified]    : 1955    Age: 69 y.o. Sex: female    Date: 2022    PCP: No primary care provider on file. Referring Provider:    Chief Complaint:   Chief Complaint   Patient presents with    Back Pain       History of Present Illness:    Tania Hidalgo is a 69 y.o. female who presents to the office with primary complaints of right sided low back pain that circles around and into her groin area. She has a history of having left SI fusion completed in March of this year. Says that her SI was slipping. She was scheduled to have a right SI fusion completed and the physician that was going to complete the procedure went on a leave of absence and it is unknown to when he will be back. She explains that exercise, sitting, standing, walking and bending forward makes the pain worse, while sitting improves the pain. Says that it is about impossible to walk up or down steps or even across the room due to her right sacroiliac joint being so unstable. She has been to PT at which time her joint was slipping out of position. She has been sent here per Dr. Taurus Pop to have injections completed on the right SI joint to prepare for fusion. Employment: Retired []   Disabled  []   Works []     Does Not Work [x]     Previous Injury:  Yes  []   No [x]     Previous Surgery: Yes [x]   No []Left SI fusion    Previous Physical Therapy In the last 6 months? Yes  [x]    No []   Did Physical Therapy make thepain better or worse? Better []   Worse [x]  Unchanged []     MRI in the last two years? Yes [x]  No []  Results reviewed with patient? Yes [x]   No []    CT Scan in the last two years? Yes  [x]   No []  Results reviewed with patient? Yes [x]   No []     X-ray in the last two years? Yes [x]   No  []  Results reviewed with patient?    Yes  [x]  No [] Injections in the past?  Yes [x]   No [] Left SI  Did the injections help relieve the pain? Yes [x]   No []     Do you have Depression? Yes  []    No [x]  Thinking of harming yourself or others? Yes  []   No [x]    Past Medical Histoy  Past Medical History:   Diagnosis Date    Allergic rhinitis     Alopecia     Cervical radiculopathy     Gastritis     Hypertension     IBS (irritable bowel syndrome)     Idiopathic insomnia     Internal hemorrhoids     Irregular heart beat     Irritable bowel syndrome with both constipation and diarrhea     Seborrheic dermatitis of scalp     Thyroid disease     Trochanteric bursitis of both hips     Venous insufficiency        Surgery History  Past Surgical History:   Procedure Laterality Date    ARTHRODESIS  2012    C1-C2    BACK SURGERY Left 03/2022    left si joint fusion    CHOLECYSTECTOMY      COLONOSCOPY  09/12/2011    internal hemorrhoids    DILATION AND CURETTAGE OF UTERUS      HEMORRHOID SURGERY      HYSTERECTOMY (CERVIX STATUS UNKNOWN)      ovaries remain, age 32 yrs old    ROTATOR CUFF REPAIR Left     TEMPOROMANDIBULAR JOINT SURGERY Left     1990s    TUBAL LIGATION          Allergies  Patient has no known allergies. Current Medications  Current Outpatient Medications   Medication Sig Dispense Refill    Cholecalciferol (VITAMIN D3) 125 MCG (5000 UT) TABS Take 1 tablet by mouth daily      Omega-3 300 MG CAPS Take 2 capsules by mouth daily      Coenzyme Q10 (CO Q 10 PO) Take 1 tablet by mouth daily      levothyroxine (SYNTHROID) 50 MCG tablet Take 50 mcg by mouth daily      lisinopril (PRINIVIL;ZESTRIL) 20 MG tablet Take 20 mg by mouth daily      Magnesium Gluconate 500 (27 Mg) MG TABS tablet Take 500 mg by mouth daily      Probiotic Product (PROBIOTIC DAILY PO) Take by mouth       No current facility-administered medications for this encounter.        Social History    Social History     Tobacco Use    Smoking status: Never    Smokeless tobacco: Never   Substance Use Topics    Alcohol use: No         Family History  family history includes High Blood Pressure in her mother. Review of Systems:  Constitutional: denies fever, chills, fatigue, change in appetite, weight gain or weight loss  Head: Normocephalic  Skin: denies easy bruising, skin redness, skin rash, hives, sensitivity to sun exposure, tightness, nodules or bumps, hair loss, color changes in the hands or feet, or feeling of temperature change such as coldness  Eyes: denies pain, redness, loss of vision, double or blurred vision, eye drainage, or dryness. ENT and Mouth: denies ringing in the ears, loss of hearing, nasal congestion, nasal discharge, no hoarseness, sore throat, or difficulty swallowing   Respiratory: denies chronic dry cough, coughing up blood, coughing up mucus, waking at night coughing or choking, or wheezing  Cardiovascular: denies chest pain, irregular heartbeats, palpitations, shortness of breath, or edema in legs  Gastrointestinal: denies, nausea, vomiting, heartburn, diarrhea, constipation  Genitourinary: denies difficult urination, pain or burning with urination, blood in the urine, or cloudy urine  Musculoskeletal: denies arm, buttock, thigh or calf cramps. Has pain in right low back that circles around to her right groin, tenderness in right low back. No muscle weakness. No joint swelling. Neurologic: headache, dizziness, fainting, loss of consciousness, nno sensitivity, no memory loss. .    Endocrine: denies intolerance to hot or cold temperature, night sweats, flushing, fingernail changes, increased thirst, or hairloss   Hematologic/ Lymphatic: denies anemia, bleeding tendency or clotting tendency, bruising easily. Allergic/ Immunologic: denies rhinitis, asthma, skin sensitivity, or Latex allergy  Psychiatric: denies depression or thoughts of suicide, or voices in head.        Current Pain Assessment:   Pain Assessment  Pain Assessment: 0-10  Pain Level: 6  Pain Location: Back  Pain Orientation: Lower  Pain Type: Chronic pain    Clinical Progression: gradually improving  Effect of Pain on Daily Activities: limits activity  Patient's Stated Pain Goal: No pain  Pain Intervention(s): Medication (see eMar), Repositioning, Rest, Ice    Current PE.7    ORT Score: 0    PHQ-9 Score: 3    Physical Exam:    Vitals:    22 1025   BP: (!) 176/87   Pulse: 69   Resp: 16   Temp: (!) 96.4 °F (35.8 °C)   TempSrc: Temporal   SpO2: 97%   Weight: 176 lb (79.8 kg)   Height: 5' 3\" (1.6 m)       Body mass index is 31.18 kg/m². General Appearance: No acute distress. Appears to be well dressed  Skin Exam: Warm and dry, no jaundice, rashes or leasions  Head Exam: NCAT, PERRLA, EOMI, scalp normal  Eye Exam: PERRLA, EOMI, conjunctivae clear  Ear Exam: Normal external auricles. No drainage from ear canals  Nose Exam: Normal alignment. Turbinates clear. No drainage  Mouth Exam: Oral mucosa pink and moist. Gums pink. Throat Exam: Posterior pharynx pink in color with no edema  Neck Exam: Supple, trachea midline. No masses palpated. Respiratory Exam: Clear to ausculation in all lobes anterior and posterior. Cardiovascular Exam: Regular rate and rhythm, no gallops, no rubs or murmurs. No edema in extremities  Gastrointestinal Exam: Bowel sounds in all quadrants, soft, non-distended, non-tender with palpation, no guarding   Musculoskeletal Exam: No joint swelling or deformity. Back Exam: Positive Maksim's right.  Tenderness with palpation over right SI joint area  Hip Exam: Full rotation bilateral  Shoulder Exam: Full rotation bilateral  Knee Exam: Full flexion and extension bilateral  Extremities: No rash, cyanosis or bruising  Neurologic Exam: Gait and coordination normal, speech normal and clear  Reflexes: Normal brachialis, Negative Puentes's bilateral. Normal Patellar bilateral,   CN EXAM: II-XII intact, face symmetrical, tongue symmetrical, the trapezius and sternocleidomastoid muscle appearance and strength symmetrical, normal achilles bilateral, ankle clonus negative bilateral  Strength: 5/5 RUE Bi's/Tri's, 5/5 LUE Bi's/Tri's, 5/5 RLE knee flex/ext, 5/5 RLE DF/PF, 5/5 LLE knee flex/ext, 5/5 LLE DF/PF  Sensation: Equal and intact to fine touch in all extremities  Mood and affect: Normal limits  Nurses note reviewed along with current vital signs    Active Problem(s)  Active Problems:    Sacroiliac joint dysfunction of right side  Resolved Problems:    * No resolved hospital problems. *                                                                                                                                 PLAN:  1. Patient is to call the office with any questions or concerns that may arise prior to next appointment. 2. Schedule patient for right SI joint injection with Kenalog this Thursday  3. Schedule patient for right SI joint injection with lidocaine and bupivacaine only next Thursday  4. Schedule patient for follow up the following week. Urine Drug Screen Today:   Yes  []    No  [x]     Discussion:  Discussed exam findings and plan of care with patient. Patient agreed with the current plan of care at this time. All questions from the patient were answered by the provider. Activity:   Discussed exercise as beneficial to pain reduction, encouraged stretching exercise with a focus on torso strengthening. Education Provided:  Review of Bartolome Chicas [x]  Agreement Review  [x]  Reviewed PHQ-9  [x]      Reviewed ORT [x]  Review of Test  [x]  Compliance Issues Discussed [x]   Cognitive Behavior Needs  []  Exercise  [x]  Financial Issues  []   Tobacco/Alcohol Use  []  Teaching  [x]   New Patient Picture Obtained  [x]      [] Benzodiazapine's and Narcotics:  Patient educated on the possible effects of combining Benzodiazapine's and Opioids. Explained \"Black Box Warnings\" such as; possible suppressed breathing, hypoxia, anoxia, depressed cognition, heart arrhythmia, coma and possible death.   Patient verbalized understanding concerning possible effects. Controlled Substance Monitoring:   Attestation: The RAÚL report for this patient was reviewed today. Discussed with patient possible medication side effects, risk of tolerance, dependence and alternative treatments. Discussed thegrowing epidemic in the [place] with the overprescribing and at times the abuse of narcotics. Discussed the detrimental effects of long term narcotic use. Patient encouraged to set daily goals of exercising and decreasing dailynarcotic intake. Discussed with the patient about the development of hyperalgesia with long term narcotic intake. EMR dragon/transcription disclaimer: Much of this encounter note is electronic transcription/translation of spoken language to printed tach. Electronic translation of spoken language may be erroneous, or at times, nonsensical words or phrases may be inadvertently transcribed. Although, I have reviewed the note for such errors, some may still exist.    CC:  No primary care provider on file. Thank you for this kind referral and allowing me to participate    in your patients care.     1 [place], [facility], 9/13/2022 at 12:23 PM

## 2022-09-13 NOTE — PROGRESS NOTES
Clinic Documentation      Education Provided:  [x] Review of Theta Daniel  [x] Agreement Review  [x] PEG Score Calculated [x] PHQ Score Calculated [x] ORT Score Calculated    [] Compliance Issues Discussed [] Cognitive Behavior Needs [x] Exercise [] Review of Test [] Financial Issues  [x] Tobacco/Alcohol Use Reviewed [x] Teaching [x] New Patient [x] Picture Obtained    Physician Plan:  [] Outgoing Referral  [] Pharmacy Consult  [] Test Ordered [] Prescription Ordered/Changed   [] Obtained Test Results / Consult Notes        Complete if patient is withholding blood thinner for procedure     Blood Thinner Patient is currently taking:      [] Plavix (Hold for 7 days)  [] Aspirin (Hold for 5 days)     [] Pletal (Hold for 2 days)  [] Pradaxa (Hold for 3 days)    [] Effient (Hold for 7 days)  [] Xarelto (Hold for 2 days)    [] Eliquis (Hold for 2 days)  [] Brilinta (Hold for 7 days)    [] Coumadin (Hold for 5 days) - (INR needs to be drawn the day prior to procedure- INR < 2.0)    [] Aggrenox (Hold for 7 days)        [] Patient will stop medication on their own.    [] Blood Thinner Form Faxed for approval to hold.    Provider form faxed to:     Assessment Completed by:  Electronically signed by Maine Millard RN on 9/13/2022 at 11:08 AM

## 2022-09-13 NOTE — TELEPHONE ENCOUNTER
Patient called to let me know how PM appt went this morning. She is schedule for both her injections in the next two weeks. She has a follow up with PM 9/26/22 and I have scheduled her a follow up with Dr Misti Gonzales later in the day to discuss surgery. Per Jono Guerrero with [facility], pt is good to go for surgery after both injections. As of now, if Dr Misti Gonzales would like to continue with the surgery, her insurance is good to go and she is ready to be scheduled at any time.

## 2022-09-15 ENCOUNTER — HOSPITAL ENCOUNTER (OUTPATIENT)
Dept: PAIN MANAGEMENT | Age: 67
Discharge: HOME | End: 2022-09-15
Payer: MEDICARE

## 2022-09-15 VITALS
TEMPERATURE: 96.9 F | DIASTOLIC BLOOD PRESSURE: 88 MMHG | HEART RATE: 69 BPM | RESPIRATION RATE: 18 BRPM | OXYGEN SATURATION: 99 % | SYSTOLIC BLOOD PRESSURE: 148 MMHG

## 2022-09-15 PROCEDURE — 20552 NJX 1/MLT TRIGGER POINT 1/2: CPT | Performed by: NURSE PRACTITIONER

## 2022-09-15 PROCEDURE — 20611 DRAIN/INJ JOINT/BURSA W/US: CPT

## 2022-09-15 PROCEDURE — 6360000002 HC RX W HCPCS

## 2022-09-15 PROCEDURE — 76942 ECHO GUIDE FOR BIOPSY: CPT | Performed by: NURSE PRACTITIONER

## 2022-09-15 PROCEDURE — 2500000003 HC RX 250 WO HCPCS

## 2022-09-15 RX ORDER — BUPIVACAINE HYDROCHLORIDE 5 MG/ML
1 INJECTION, SOLUTION EPIDURAL; INTRACAUDAL ONCE
Status: DISCONTINUED | OUTPATIENT
Start: 2022-09-15 | End: 2022-09-17 | Stop reason: HOSPADM

## 2022-09-15 RX ORDER — LIDOCAINE HYDROCHLORIDE 10 MG/ML
1 INJECTION, SOLUTION EPIDURAL; INFILTRATION; INTRACAUDAL; PERINEURAL ONCE
Status: DISCONTINUED | OUTPATIENT
Start: 2022-09-15 | End: 2022-09-17 | Stop reason: HOSPADM

## 2022-09-15 RX ORDER — TRIAMCINOLONE ACETONIDE 40 MG/ML
40 INJECTION, SUSPENSION INTRA-ARTICULAR; INTRAMUSCULAR ONCE
Status: DISCONTINUED | OUTPATIENT
Start: 2022-09-15 | End: 2022-09-17 | Stop reason: HOSPADM

## 2022-09-15 NOTE — DISCHARGE INSTRUCTIONS
[facility] Physical And Pain Medicine  Post Procedure Discharge Instructions        YOU HAVE HAD THE FOLLOWING PROCEDURE:                                  [] Occipital Nerve Blocks  [] CTS wrist injection(s)  [] Knee Injection(s)         [] Shoulder Injection(s)   [] Elbow Injection(s)     [] Botox Injection  [] Cervical Trigger Point Injections    [] Thoracic Trigger Point Injections    [] Lumbar Trigger Point Injections  [] Piriformis Trigger Point Injections  [x] SI Joint Injection(s)     [] Trochanteric Bursa Injection(s)       [] Ankle Injection(s)   [] Plantar Fasciitis   []  ______________  Injection(s) [] Botox []  Migraines [] Spasticity    YOU HAVE RECEIVED THE FOLLOWING MEDICATIONS IN YOUR INJECTION(s)  [x] Lidocaine [x] Bupivacaine   [] DepoMedrol (steroid) [] Decadron (steroid)  [x]  Kenalog (steroid)   [] Toradol  [] Supartz [] Maynor Bouquet    [] Botox        PATIENT INFORMATION:   You may experience the following symptoms after your procedure. These symptoms are normal and should not cause concern: You may have an increase in your pain. This may last 24 - 48 hours after your procedure. You may have no change in the pain that you had prior to your injection(s). You may have weakness or numbness in your affected extremity. If this occurs, this may last until numbing the medication wears off. REPORT THE FOLLOWING SYMPTOMS TO YOUR DOCTOR:  Redness, swelling or drainage at the injection site(s)  Unusual pain that interferes with your normal activities of daily living. OTHER INSTRUCTIONS:    [x] I will apply ice to the injection site(s) for at least 24 hours after the procedure. I will rotate the ice on for 20 minutes and off for 20 minutes for at least 24 hours. [x] I will not apply heat for at least 48 hours and I will not take a hot bath or shower for at least 24 hours.      [x] I understand that if Lidocaine or Bupivacaine was used in my injection(s) that the injection site(s) will be numb for a few hours after the procedure    [x] I understand if a steroid was used it will take effect in 3 - 7 days. I understand that as the numbing medication wears off, the pain may return until the steroids start working. [x] I understand that today I will rest for the next 24 hours and drink plenty of water. [] For Botox for Migraines please do not wear anything constricting around the forehead for 7-10 days post injection. Examples headband, hats, or bandana    [] For Botox for Spasticity start therapy for the affected limb in two weeks. [] Additional instructions: ______________________________________________ ___________________________________________________________________    Sedation:  Was oral sedation given? [] Yes  [x] No    I understand that if I took an oral nerve calming medication I will not drive for  [] 24 hours after taking the medication.     [x] I have received a copy of my discharge instructions and understand the above instructions to the best of my knowledge    Patient Discharged:  [x] Home  [] Hospital  [] Other  ____________________________________________    Via:  [x] Ambulatory  [] Wheelchair   [] Stretcher [] EMS       Accompanied By:   [] Significant other  [] Family Member  [] Friend   [] Hospital Staff  []  Ambulance Staff  [] Other_______________________________________________    Plan:  [x] Will return to the office in   [] 1 month   [] 3 months for:  [] Procedure Follow-up  [] Office Visit   [x] Planned Procedure      Patient Signature: _____________________________________________________    Staff Signature: _______________________________________________________        If you have questions, problems or concerns you may call [phone] and follow the prompts    AMADOR Workman

## 2022-09-16 NOTE — PROCEDURES
[facility] Physical and Pain Medicine      Patient Name: Joanne Savage    : 1955    Age: 69 y.o. Sex: female    Date: 9/15/2022    Pre-op Diagnosis: [x]  Right  [] Left  [] Bilateral  Sacroiliac Joint(s) Dysfunction/ Sacroiliitis    Post-op Diagnosis: [x] Right   [] Left  [] Bilateral Sacroiliac Joint(s) Dysfunction/ Sacroliliits    Procedure: Ultrasound Guided Injection of  [x] Right  [] Left  [] Bilateral Sacroiliac Joint(s)     Performing Procedure:  Brian Mena, MSN, APRN, FNP-C    Previously Had Procedure:  [] Yes   [x] No    No data found. Description of Procedure:    After a brief physical assessment and failure to improve with conservative measures the patient presented for an injection of the  [x] Right  [] Left   [] Bilateral Sacroiliac Joint(s). The indications, limitations and possible complications were discussed with the patient and the patient elected to proceed with the procedure. [x] Right Sacroiliac Joint    After voluntary, informed and signed consent obtained the patient was placed in the prone position. Appropriate time out was obtained per policy. The area of maximal tenderness was palpated over the Right Sacroiliac Joint and the skin overlying this area marked with a skin marker. The skin was then sprayed with Gebauer's Solution and prepped in a sterile fashion with Prevantics swab. The ultrasound transducer was brought in and the Right Sacroiliac Joint was identified with ultrasound. Under sterile technique and direct ultrasound visualization a 22 gauge 3 inch spinal needle was introduced into the Right Sacroiliac Joint. After a negative aspiration a solution of    [x] Kenalog 1 ml (40mg/ml), 1 ml of 0.5% Marcaine Plain and 1 ml of 1% Lidocaine Plain     [] Toradol 1 ml (30 mg/ml), 1 ml of 0.5% Marcaine Plain and 1 ml of 1% Lidocaine Plain      was injected into the Right Sacroiliac Joint.  The needle was withdrawn and a sterile dressing applied. [] Left Sacroiliac Joint    After voluntary, informed and signed consent obtained the patient was placed in the prone position. Appropriate time out was obtained per policy. The area of maximal tenderness was palpated over the Left Sacroiliac Joint and the skin overlying this area marked with a skin marker. The skin was then sprayed with Gebauer's Solution and prepped in a sterile fashion with Prevantics swab. The ultrasound transducer was brought in and the Left Sacroiliac Joint was identified with ultrasound. Under sterile technique and direct ultrasound visualization a 22 gauge 3 inch spinal needle was introduced into the Left Sacroiliac Joint. After a negative aspiration a solution of    [] Kenalog 1 ml (40mg/ml), 1 ml of 0.5% Marcaine Plain and 1 ml of 1% Lidocaine Plain     [] Toradol 1 ml (30 mg/ml), 1 ml of 0.5% Marcaine Plain and 1 ml of 1% Lidocaine Plain      was injected into the Left Sacroiliac Joint. The needle was withdrawn and a sterile dressing applied. Discharge: The patient tolerated the procedure well. There were no complications during the procedure and the patient was discharged home with discharge instructions. The patient has been instructed to contact the office should there be any complications or questions to arise between today and their next appointment.     Plan:  [x] Will return to the office in   [] 1 month  [] 4 - 6 weeks  [] 2 months  [] 3 months for: 1 week for repeat SI joint injection with lidocaine and bupivacaine only  [] Planned Procedure   [] Procedure Follow-up    [] Office Visit      1 [facility], [facility], 9/16/2022 at 12:18 PM

## 2022-09-22 ENCOUNTER — HOSPITAL ENCOUNTER (OUTPATIENT)
Dept: PAIN MANAGEMENT | Age: 67
Discharge: HOME | End: 2022-09-22
Payer: MEDICARE

## 2022-09-22 VITALS
OXYGEN SATURATION: 100 % | HEART RATE: 70 BPM | DIASTOLIC BLOOD PRESSURE: 80 MMHG | TEMPERATURE: 96.2 F | SYSTOLIC BLOOD PRESSURE: 160 MMHG | RESPIRATION RATE: 18 BRPM

## 2022-09-22 PROCEDURE — 20611 DRAIN/INJ JOINT/BURSA W/US: CPT

## 2022-09-22 PROCEDURE — 20552 NJX 1/MLT TRIGGER POINT 1/2: CPT | Performed by: NURSE PRACTITIONER

## 2022-09-22 PROCEDURE — 76942 ECHO GUIDE FOR BIOPSY: CPT | Performed by: NURSE PRACTITIONER

## 2022-09-22 PROCEDURE — 2500000003 HC RX 250 WO HCPCS

## 2022-09-22 RX ORDER — BUPIVACAINE HYDROCHLORIDE 5 MG/ML
1.5 INJECTION, SOLUTION EPIDURAL; INTRACAUDAL ONCE
Status: DISCONTINUED | OUTPATIENT
Start: 2022-09-22 | End: 2022-09-24 | Stop reason: HOSPADM

## 2022-09-22 RX ORDER — LIDOCAINE HYDROCHLORIDE 10 MG/ML
1.5 INJECTION, SOLUTION EPIDURAL; INFILTRATION; INTRACAUDAL; PERINEURAL ONCE
Status: DISCONTINUED | OUTPATIENT
Start: 2022-09-22 | End: 2022-09-24 | Stop reason: HOSPADM

## 2022-09-22 NOTE — PROGRESS NOTES
[facility] Physical And Pain Medicine  Post Procedure Discharge Instructions        YOU HAVE HAD THE FOLLOWING PROCEDURE:                                  [] Occipital Nerve Blocks  [] CTS wrist injection(s)  [] Knee Injection(s)         [] Shoulder Injection(s)   [] Elbow Injection(s)     [] Botox Injection  [] Cervical Trigger Point Injections    [] Thoracic Trigger Point Injections    [] Lumbar Trigger Point Injections  [] Piriformis Trigger Point Injections  [x] SI Joint Injection(s)     [] Trochanteric Bursa Injection(s)       [] Ankle Injection(s)   [] Plantar Fasciitis   []  ______________  Injection(s) [] Botox []  Migraines [] Spasticity    YOU HAVE RECEIVED THE FOLLOWING MEDICATIONS IN YOUR INJECTION(s)  [x] Lidocaine [x] Bupivacaine   [] DepoMedrol (steroid) [] Decadron (steroid)  []  Kenalog (steroid)   [] Toradol  [] Supartz [] Carilyn Alert    [] Botox        PATIENT INFORMATION:   You may experience the following symptoms after your procedure. These symptoms are normal and should not cause concern: You may have an increase in your pain. This may last 24 - 48 hours after your procedure. You may have no change in the pain that you had prior to your injection(s). You may have weakness or numbness in your affected extremity. If this occurs, this may last until numbing the medication wears off. REPORT THE FOLLOWING SYMPTOMS TO YOUR DOCTOR:  Redness, swelling or drainage at the injection site(s)  Unusual pain that interferes with your normal activities of daily living. OTHER INSTRUCTIONS:    [x] I will apply ice to the injection site(s) for at least 24 hours after the procedure. I will rotate the ice on for 20 minutes and off for 20 minutes for at least 24 hours. [x] I will not apply heat for at least 48 hours and I will not take a hot bath or shower for at least 24 hours.      [x] I understand that if Lidocaine or Bupivacaine was used in my injection(s) that the injection site(s) will be numb for a few hours after the procedure    [] I understand if a steroid was used it will take effect in 3 - 7 days. I understand that as the numbing medication wears off, the pain may return until the steroids start working. [x] I understand that today I will rest for the next 24 hours and drink plenty of water. [] For Botox for Migraines please do not wear anything constricting around the forehead for 7-10 days post injection. Examples headband, hats, or bandana    [] For Botox for Spasticity start therapy for the affected limb in two weeks. [] Additional instructions: ______________________________________________ ___________________________________________________________________    Sedation:  Was oral sedation given? [] Yes  [x] No    I understand that if I took an oral nerve calming medication I will not drive for  [] 24 hours after taking the medication.     [x] I have received a copy of my discharge instructions and understand the above instructions to the best of my knowledge    Patient Discharged:  [x] Home  [] Hospital  [] Other  ____________________________________________    Via:  [x] Ambulatory  [] Wheelchair   [] Stretcher [] EMS       Accompanied By:   [] Significant other  [] Family Member  [] Friend   [] Hospital Staff  []  Ambulance Staff  [] Other_______________________________________________    Plan:  [x] Will return to the office in   [] 1 month   [] 3 months for:  [] Procedure Follow-up  [] Office Visit   [] Planned Procedure      Patient Signature: _____________________________________________________    Staff Signature: _______________________________________________________        If you have questions, problems or concerns you may call [phone] and follow the prompts    MEDINA Markham, [place]

## 2022-09-22 NOTE — PROCEDURES
[facility] Physical and Pain Medicine      Patient Name: Jono Mojica    : 1955    Age: 69 y.o. Sex: female    Date: 9/15/2022    Pre-op Diagnosis: [x]  Right  [] Left  [] Bilateral  Sacroiliac Joint(s) Dysfunction/ Sacroiliitis    Post-op Diagnosis: [x] Right   [] Left  [] Bilateral Sacroiliac Joint(s) Dysfunction/ Sacroliliits    Procedure: Ultrasound Guided Injection of  [x] Right  [] Left  [] Bilateral Sacroiliac Joint(s)     Performing Procedure:  Daniel Diallo, MSN, APRN, FNP-C    Previously Had Procedure:  [x] Yes   [] No    Patient Vitals for the past 24 hrs:   BP Temp Temp src Pulse Resp SpO2   22 0949 (!) 160/80 (!) 96.2 °F (35.7 °C) Temporal 70 18 100 %       Description of Procedure:    After a brief physical assessment and failure to improve with conservative measures the patient presented for an injection of the  [x] Right  [] Left   [] Bilateral Sacroiliac Joint(s). The indications, limitations and possible complications were discussed with the patient and the patient elected to proceed with the procedure. [x] Right Sacroiliac Joint    After voluntary, informed and signed consent obtained the patient was placed in the prone position. Appropriate time out was obtained per policy. The area of maximal tenderness was palpated over the Right Sacroiliac Joint and the skin overlying this area marked with a skin marker. The skin was then sprayed with Gebauer's Solution and prepped in a sterile fashion with Prevantics swab. The ultrasound transducer was brought in and the Right Sacroiliac Joint was identified with ultrasound. Under sterile technique and direct ultrasound visualization a 22 gauge 3 inch spinal needle was introduced into the Right Sacroiliac Joint. After a negative aspiration a solution of    1 1/2 ml of 1 % Lidocaine and 1 1/2 ml 5% Bupivicane      was injected into the Right Sacroiliac Joint.  The needle was withdrawn and a sterile dressing applied. [] Left Sacroiliac Joint    After voluntary, informed and signed consent obtained the patient was placed in the prone position. Appropriate time out was obtained per policy. The area of maximal tenderness was palpated over the Left Sacroiliac Joint and the skin overlying this area marked with a skin marker. The skin was then sprayed with Gebauer's Solution and prepped in a sterile fashion with Prevantics swab. The ultrasound transducer was brought in and the Left Sacroiliac Joint was identified with ultrasound. Under sterile technique and direct ultrasound visualization a 22 gauge 3 inch spinal needle was introduced into the Left Sacroiliac Joint. After a negative aspiration a solution of    [] Kenalog 1 ml (40mg/ml), 1 ml of 0.5% Marcaine Plain and 1 ml of 1% Lidocaine Plain     [] Toradol 1 ml (30 mg/ml), 1 ml of 0.5% Marcaine Plain and 1 ml of 1% Lidocaine Plain      was injected into the Left Sacroiliac Joint. The needle was withdrawn and a sterile dressing applied. Discharge: The patient tolerated the procedure well. There were no complications during the procedure and the patient was discharged home with discharge instructions. The patient has been instructed to contact the office should there be any complications or questions to arise between today and their next appointment.     Plan:  [x] Will return to the office in   [] 1 month  [] 4 - 6 weeks  [] 2 months  [] 3 months for: 1 week for repeat SI joint injection with lidocaine and bupivacaine only  [] Planned Procedure   [] Procedure Follow-up    [] Office Visit      1 [facility], [facility], 9/22/2022 at 10:43 AM

## 2022-09-22 NOTE — DISCHARGE INSTRUCTIONS
[facility] Physical And Pain Medicine  Post Procedure Discharge Instructions        YOU HAVE HAD THE FOLLOWING PROCEDURE:                                  [] Occipital Nerve Blocks  [] CTS wrist injection(s)  [] Knee Injection(s)         [] Shoulder Injection(s)   [] Elbow Injection(s)     [] Botox Injection  [] Cervical Trigger Point Injections    [] Thoracic Trigger Point Injections    [] Lumbar Trigger Point Injections  [] Piriformis Trigger Point Injections  [x] SI Joint Injection(s)     [] Trochanteric Bursa Injection(s)       [] Ankle Injection(s)   [] Plantar Fasciitis   []  ______________  Injection(s) [] Botox []  Migraines [] Spasticity    YOU HAVE RECEIVED THE FOLLOWING MEDICATIONS IN YOUR INJECTION(s)  [x] Lidocaine [x] Bupivacaine   [] DepoMedrol (steroid) [] Decadron (steroid)  []  Kenalog (steroid)   [] Toradol  [] Supartz [] Marilyne Combe    [] Botox        PATIENT INFORMATION:   You may experience the following symptoms after your procedure. These symptoms are normal and should not cause concern: You may have an increase in your pain. This may last 24 - 48 hours after your procedure. You may have no change in the pain that you had prior to your injection(s). You may have weakness or numbness in your affected extremity. If this occurs, this may last until numbing the medication wears off. REPORT THE FOLLOWING SYMPTOMS TO YOUR DOCTOR:  Redness, swelling or drainage at the injection site(s)  Unusual pain that interferes with your normal activities of daily living. OTHER INSTRUCTIONS:    [x] I will apply ice to the injection site(s) for at least 24 hours after the procedure. I will rotate the ice on for 20 minutes and off for 20 minutes for at least 24 hours. [x] I will not apply heat for at least 48 hours and I will not take a hot bath or shower for at least 24 hours.      [x] I understand that if Lidocaine or Bupivacaine was used in my injection(s) that the injection site(s) will be numb for a few hours after the procedure    [] I understand if a steroid was used it will take effect in 3 - 7 days. I understand that as the numbing medication wears off, the pain may return until the steroids start working. [x] I understand that today I will rest for the next 24 hours and drink plenty of water. [] For Botox for Migraines please do not wear anything constricting around the forehead for 7-10 days post injection. Examples headband, hats, or bandana    [] For Botox for Spasticity start therapy for the affected limb in two weeks. [] Additional instructions: ______________________________________________ ___________________________________________________________________    Sedation:  Was oral sedation given? [] Yes  [x] No    I understand that if I took an oral nerve calming medication I will not drive for  [] 24 hours after taking the medication.     [x] I have received a copy of my discharge instructions and understand the above instructions to the best of my knowledge    Patient Discharged:  [x] Home  [] Hospital  [] Other  ____________________________________________    Via:  [x] Ambulatory  [] Wheelchair   [] Stretcher [] EMS       Accompanied By:   [] Significant other  [] Family Member  [] Friend   [] Hospital Staff  []  Ambulance Staff  [] Other_______________________________________________    Plan:  [x] Will return to the office in   [] 1 month   [] 3 months for:  [] Procedure Follow-up  [] Office Visit   [] Planned Procedure      Patient Signature: _____________________________________________________    Staff Signature: _______________________________________________________        If you have questions, problems or concerns you may call [phone] and follow the prompts    MEDINA Elias, [place]-BC

## 2022-09-26 ENCOUNTER — HOSPITAL ENCOUNTER (OUTPATIENT)
Dept: PAIN MANAGEMENT | Age: 67
Discharge: HOME | End: 2022-09-26
Admitting: NURSE PRACTITIONER
Payer: MEDICARE

## 2022-09-26 ENCOUNTER — OFFICE VISIT (OUTPATIENT)
Dept: NEUROSURGERY | Age: 67
End: 2022-09-26
Payer: MEDICARE

## 2022-09-26 VITALS
WEIGHT: 173.01 LBS | HEIGHT: 63 IN | TEMPERATURE: 97.2 F | RESPIRATION RATE: 18 BRPM | OXYGEN SATURATION: 99 % | BODY MASS INDEX: 30.66 KG/M2 | HEART RATE: 63 BPM | DIASTOLIC BLOOD PRESSURE: 81 MMHG | SYSTOLIC BLOOD PRESSURE: 157 MMHG

## 2022-09-26 VITALS
SYSTOLIC BLOOD PRESSURE: 140 MMHG | HEIGHT: 63 IN | HEART RATE: 80 BPM | DIASTOLIC BLOOD PRESSURE: 88 MMHG | RESPIRATION RATE: 18 BRPM | BODY MASS INDEX: 30.65 KG/M2 | WEIGHT: 173 LBS

## 2022-09-26 DIAGNOSIS — M53.3 SACROILIAC JOINT DYSFUNCTION OF LEFT SIDE: ICD-10-CM

## 2022-09-26 DIAGNOSIS — M53.3 SACROILIAC JOINT DYSFUNCTION OF RIGHT SIDE: Primary | ICD-10-CM

## 2022-09-26 PROCEDURE — 99215 OFFICE O/P EST HI 40 MIN: CPT | Performed by: NEUROLOGICAL SURGERY

## 2022-09-26 PROCEDURE — 1123F ACP DISCUSS/DSCN MKR DOCD: CPT | Performed by: NEUROLOGICAL SURGERY

## 2022-09-26 PROCEDURE — G8427 DOCREV CUR MEDS BY ELIG CLIN: HCPCS | Performed by: NEUROLOGICAL SURGERY

## 2022-09-26 PROCEDURE — 99213 OFFICE O/P EST LOW 20 MIN: CPT

## 2022-09-26 PROCEDURE — 3017F COLORECTAL CA SCREEN DOC REV: CPT | Performed by: NEUROLOGICAL SURGERY

## 2022-09-26 PROCEDURE — 99213 OFFICE O/P EST LOW 20 MIN: CPT | Performed by: NURSE PRACTITIONER

## 2022-09-26 PROCEDURE — 1036F TOBACCO NON-USER: CPT | Performed by: NEUROLOGICAL SURGERY

## 2022-09-26 PROCEDURE — 1090F PRES/ABSN URINE INCON ASSESS: CPT | Performed by: NEUROLOGICAL SURGERY

## 2022-09-26 PROCEDURE — G8417 CALC BMI ABV UP PARAM F/U: HCPCS | Performed by: NEUROLOGICAL SURGERY

## 2022-09-26 PROCEDURE — G8400 PT W/DXA NO RESULTS DOC: HCPCS | Performed by: NEUROLOGICAL SURGERY

## 2022-09-26 ASSESSMENT — ENCOUNTER SYMPTOMS
BACK PAIN: 1
RESPIRATORY NEGATIVE: 1
EYES NEGATIVE: 1
GASTROINTESTINAL NEGATIVE: 1

## 2022-09-26 ASSESSMENT — PAIN SCALES - GENERAL: PAINLEVEL_OUTOF10: 4

## 2022-09-26 ASSESSMENT — PAIN DESCRIPTION - LOCATION: LOCATION: HIP;BACK

## 2022-09-26 ASSESSMENT — PAIN DESCRIPTION - ORIENTATION: ORIENTATION: RIGHT

## 2022-09-26 NOTE — H&P
[facility] Neurosurgery  History and Physical        Chief Complaint   Patient presents with    Follow-up     Patient is here to follow up after PM injections for sacroiliac joint pain of the right side. She states that she received relief from the first injection and helped reduce inflammation. She states that she second injection did not help as much. HISTORY OF PRESENT ILLNESS:        Interval Update:    Planned follow-up to review recent imaging and to discuss her response to right SI injections performed with pain management. She received a course of two injections 1 weeks apart. She reports that she received excellent pain relief from the first injection (>60%) and she was able to increase her activity significantly. The second injection was less effective and she feels that she was still getting relief from the first shot. HPI:    The patient is a 69 y.o. female who presents primarily for neurosurgical evaluation of right hip and lower back pain. She has a longstanding history of back problems and underwent a left-sided SI fusion in 3/2022. At that time, she complained of severe bilateral lower back/buttock/hip pain with radiation to the anterior thighs and down her legs. She also reports an extensive course of non-operative treatment prior to surgery, including multiple bilateral SI joint injections as well as multiple courses of physical therapy. She is transferring her care because her prior surgeon has been placed on an indefinite medical leave-of-absence. She reports that her left-sided symptoms improved for several months after her immediate postoperative pain subsided, however for the past few months her left sided pain has returned. She also complains of severe pain in her right sacroiliac region. The pain is worsened by prolonged sitting and movement. She has difficulty bearing weight on one leg. She has not had any recent lumbar or pelvic imaging.         MEDICAL HISTORY: Past Medical History:   Diagnosis Date    Allergic rhinitis     Alopecia     Cervical radiculopathy     Gastritis     Hypertension     IBS (irritable bowel syndrome)     Idiopathic insomnia     Internal hemorrhoids     Irregular heart beat     Irritable bowel syndrome with both constipation and diarrhea     Seborrheic dermatitis of scalp     Thyroid disease     Trochanteric bursitis of both hips     Venous insufficiency        Past Surgical History:   Procedure Laterality Date    ARTHRODESIS  2012    C1-C2    BACK SURGERY Left 03/2022    left si joint fusion    CHOLECYSTECTOMY      COLONOSCOPY  09/12/2011    internal hemorrhoids    DILATION AND CURETTAGE OF UTERUS      HEMORRHOID SURGERY      HYSTERECTOMY (CERVIX STATUS UNKNOWN)      ovaries remain, age 32 yrs old    ROTATOR CUFF REPAIR Left     TEMPOROMANDIBULAR JOINT SURGERY Left     1990s    TUBAL LIGATION           Current Outpatient Medications:     Cholecalciferol (VITAMIN D3) 125 MCG (5000 UT) TABS, Take 1 tablet by mouth daily, Disp: , Rfl:     Omega-3 300 MG CAPS, Take 2 capsules by mouth daily, Disp: , Rfl:     Coenzyme Q10 (CO Q 10 PO), Take 1 tablet by mouth daily, Disp: , Rfl:     levothyroxine (SYNTHROID) 50 MCG tablet, Take 50 mcg by mouth daily, Disp: , Rfl:     lisinopril (PRINIVIL;ZESTRIL) 20 MG tablet, Take 20 mg by mouth daily, Disp: , Rfl:     Magnesium Gluconate 500 (27 Mg) MG TABS tablet, Take 500 mg by mouth daily, Disp: , Rfl:     Probiotic Product (PROBIOTIC DAILY PO), Take by mouth, Disp: , Rfl:     Allergies:  Patient has no known allergies. Social History:   Social History     Tobacco Use   Smoking Status Never   Smokeless Tobacco Never     Social History     Substance and Sexual Activity   Alcohol Use No         Family History:   Family History   Problem Relation Age of Onset    High Blood Pressure Mother        REVIEW OF SYSTEMS:    Constitutional: Negative. HENT: Negative. Eyes: Negative. Respiratory: Negative. Cardiovascular: Negative. Gastrointestinal: Negative. Genitourinary: Negative. Musculoskeletal:  Positive for back pain, joint pain and myalgias. Skin: Negative. Neurological:  Positive for weakness. Endo/Heme/Allergies: Negative. Psychiatric/Behavioral: Negative. Review of Systems was obtained by the medical assistant and reviewed by myself. PHYSICAL EXAM:    Vitals:    09/26/22 1145   BP: (!) 140/88   Pulse: 80   Resp: 18       Constitutional: Appears well-developed and well-nourished. Eyes - conjunctiva normal.  Pupils react to light  Ear, nose,throat -Normal pinna and tragus, No scars, or lesions over external nose or ears, no obvious atrophy of tongue  Neck- symmetric, trachea midline, no jugular vein distension  Respiration- chest wall symmetric, normal effort without use of accessory muscles  Musculoskeletal - no significant wasting of muscles noted, no bony deformities, symmetric bulk  Extremities- no clubbing, cyanosis or edema  Skin - warm, dry, and intact. No rash,erythema, or pallor.   Psychiatric - mood, affect, and behavior appear normal.       NEUROLOGIC EXAM:    MENTAL STATUS: Alert, oriented, thought content appropriate    CRANIAL NERVES: PERRL, EOMI, symmetric facies, tongue midline    MOTOR:     Right Upper Extremity:    Deltoid: 5/5  Biceps: 5/5  Triceps: 5/5  Wrist Extension: 5/5  Finger Abduction: 5/5      Left Upper Extremity:    Deltoid: 5/5  Biceps: 5/5  Triceps: 5/5  Wrist Extension: 5/5  Finger Abduction: 5/5      Right Lower Extremity:    Hip Flexion: 5/5  Knee Extension: 5/5  Ankle Plantarflexion: 5/5  Ankle Dorsiflexion: 5/5        Left Lower Extremity:    Hip Flexion: 5/5  Knee Extension: 5/5  Ankle Plantarflexion: 5/5  Ankle Dorsiflexion: 5/5      SOMATOSENSORY:     Right Upper Extremity: normal light touch sensation  Left Upper Extremity: normal light touch sensation  Right Lower Extremity: normal light touch sensation  Left Lower Extremity: normal light touch sensation      REFLEXES:    Biceps: 2+  Patella: 2+  Ankle Jerk: 1+    Puentes's: Negative      COORDINATION and GAIT:    Gait: Antalgic      PROVOCATIVE TESTS:    Straight Leg Raise: Negative on the left, weakly positive on the right (SI pain and buttock pain)  Ahmet-Finger test: Positive bilaterally  SI palpation: Strongly positive bilaterally  LIGIA: Positive bilaterally, R>L  Thigh Thrust: Positive on the right, negative on the left  Distraction: Weakly positive (non-radiating pain at site of palpation)      IMAGING:    My interpretation of imaging studies:     CT of the pelvis reviewed. There is no destructive or mass lesion. There are postsurgical changes after left SI fusion with two threaded screw-type implants. There is no evidence of hardware failure or lucency to suggest pseudoarthrosis. There is degenerative sclerosis of the right SI joint and mild bilateral hip DJD. ASSESSMENT AND PLAN:    This is a 69 y.o. female who returns to review her recent imaging and discuss her response to right SI joint injections. She had an excellent response to her first injection and reports >60% pain relief. She was able to increase her activity significantly after that injection. She has undergone a CT of her Pelvis that show appropriate postsurgical changes after her prior left SI fusion and evidence of degenerative changes in the right SI joint. She is not getting much benefit from physical therapy. I explained that given her good response to her previous SI fusion, her similar symptoms on the right side and her good response to her recent injection, I feel she is a good candidate for surgery (right SI fusion). The procedure was explained to her in detail using anatomic models. Specific risks of bleeding, infection, neurologic injury, implant misplacement, hardware failure, pseudoarthrosis and pelvic fracture were all discussed.   I also explained that this is a relatively new surgery at [facility] and that a proctoring surgeon would be present for the procedure. She voiced understanding and requested that we proceed with surgery.             London Landers MD

## 2022-09-26 NOTE — PROGRESS NOTES
[facility] Physical and Pain Medicine    History and Physical    Patient Name: Heidy Royal    MR #: [id number]    Account #: [de-identified]    : 1955    Age: 69 y.o. Sex: female    Date: 2022    PCP: No primary care provider on file. Referring Provider:    Chief Complaint:   Chief Complaint   Patient presents with    Back Pain       History of Present Illness:    Heidy Royal is a 69 y.o. female who presents to the office for follow up of Right SI joint injection with Lidocaine, Bupivacaine and Kenalog and one week later right SI joint injection with Lidocaine and Bupivacaine. She says that she had 80% relief in her pain for over 5 days with each of the injections. She feels that the injections are still helping with her pain. Does say that it continues to be difficult to lay on her left side from her former fusion that she had in March. Employment: Retired []   Disabled  []   Works []     Does Not Work [x]     Previous Injury:  Yes  []   No [x]     Previous Surgery: Yes [x]   No []Left SI fusion    Previous Physical Therapy In the last 6 months? Yes  [x]    No []   Did Physical Therapy make thepain better or worse? Better []   Worse [x]  Unchanged []     MRI in the last two years? Yes [x]  No []  Results reviewed with patient? Yes [x]   No []    CT Scan in the last two years? Yes  [x]   No []  Results reviewed with patient? Yes [x]   No []     X-ray in the last two years? Yes [x]   No  []  Results reviewed with patient? Yes  [x]  No []     Injections in the past?  Yes [x]   No [] Left SI  Did the injections help relieve the pain? Yes [x]   No []     Do you have Depression? Yes  []    No [x]  Thinking of harming yourself or others?   Yes  []   No [x]    Past Medical Histoy  Past Medical History:   Diagnosis Date    Allergic rhinitis     Alopecia     Cervical radiculopathy     Gastritis     Hypertension     IBS (irritable bowel syndrome) Idiopathic insomnia     Internal hemorrhoids     Irregular heart beat     Irritable bowel syndrome with both constipation and diarrhea     Seborrheic dermatitis of scalp     Thyroid disease     Trochanteric bursitis of both hips     Venous insufficiency        Surgery History  Past Surgical History:   Procedure Laterality Date    ARTHRODESIS  2012    C1-C2    BACK SURGERY Left 03/2022    left si joint fusion    CHOLECYSTECTOMY      COLONOSCOPY  09/12/2011    internal hemorrhoids    DILATION AND CURETTAGE OF UTERUS      HEMORRHOID SURGERY      HYSTERECTOMY (CERVIX STATUS UNKNOWN)      ovaries remain, age 32 yrs old    ROTATOR CUFF REPAIR Left     TEMPOROMANDIBULAR JOINT SURGERY Left     1990s [address]          Allergies  Patient has no known allergies. Current Medications  Current Outpatient Medications   Medication Sig Dispense Refill    Cholecalciferol (VITAMIN D3) 125 MCG (5000 UT) TABS Take 1 tablet by mouth daily      Omega-3 300 MG CAPS Take 2 capsules by mouth daily      Coenzyme Q10 (CO Q 10 PO) Take 1 tablet by mouth daily      levothyroxine (SYNTHROID) 50 MCG tablet Take 50 mcg by mouth daily      lisinopril (PRINIVIL;ZESTRIL) 20 MG tablet Take 20 mg by mouth daily      Magnesium Gluconate 500 (27 Mg) MG TABS tablet Take 500 mg by mouth daily      Probiotic Product (PROBIOTIC DAILY PO) Take by mouth       No current facility-administered medications for this encounter. Social History    Social History     Tobacco Use    Smoking status: Never    Smokeless tobacco: Never   Substance Use Topics    Alcohol use: No         Family History  family history includes High Blood Pressure in her mother.     Review of Systems:  Constitutional: denies fever, chills, fatigue, change in appetite, weight gain or weight loss  Head: Normocephalic  Skin: denies easy bruising, skin redness, skin rash, hives, sensitivity to sun exposure, tightness, nodules or bumps, hair loss, color changes in the hands or feet, or feeling of temperature change such as coldness  Eyes: denies pain, redness, loss of vision, double or blurred vision, eye drainage, or dryness. ENT and Mouth: denies ringing in the ears, loss of hearing, nasal congestion, nasal discharge, no hoarseness, sore throat, or difficulty swallowing   Respiratory: denies chronic dry cough, coughing up blood, coughing up mucus, waking at night coughing or choking, or wheezing  Cardiovascular: denies chest pain, irregular heartbeats, palpitations, shortness of breath, or edema in legs  Gastrointestinal: denies, nausea, vomiting, heartburn, diarrhea, constipation  Genitourinary: denies difficult urination, pain or burning with urination, blood in the urine, or cloudy urine  Musculoskeletal: denies arm, buttock, thigh or calf cramps. Has pain in right low back that circles around to her right groin, tenderness in right low back. No muscle weakness. No joint swelling. Neurologic: headache, dizziness, fainting, loss of consciousness, nno sensitivity, no memory loss. .    Endocrine: denies intolerance to hot or cold temperature, night sweats, flushing, fingernail changes, increased thirst, or hairloss   Hematologic/ Lymphatic: denies anemia, bleeding tendency or clotting tendency, bruising easily. Allergic/ Immunologic: denies rhinitis, asthma, skin sensitivity, or Latex allergy  Psychiatric: denies depression or thoughts of suicide, or voices in head.        Current Pain Assessment:   Pain Assessment  Pain Assessment: 0-10  Pain Level: 4  Pain Location: Hip, Back  Pain Orientation: Right    Clinical Progression: gradually improving  Effect of Pain on Daily Activities: limits activity  Patient's Stated Pain Goal: No pain  Pain Intervention(s): Medication (see eMar), Repositioning, Rest, Ice    Current PE.7    ORT Score: 0    PHQ-9 Score: 3    Physical Exam:    Vitals:    22 1008   BP: (!) 157/81   Pulse: 63   Resp: 18   Temp: 97.2 °F (36.2 °C)   TempSrc: Temporal   SpO2: 99%   Weight: 173 lb 0.2 oz (78.5 kg)   Height: 5' 3\" (1.6 m)       Body mass index is 30.65 kg/m². General Appearance: No acute distress. Appears to be well dressed  Skin Exam: Warm and dry, no jaundice, rashes or leasions  Head Exam: NCAT, PERRLA, EOMI, scalp normal  Eye Exam: PERRLA, EOMI, conjunctivae clear  Ear Exam: Normal external auricles. No drainage from ear canals  Nose Exam: Normal alignment. Turbinates clear. No drainage  Mouth Exam: Oral mucosa pink and moist. Gums pink. Throat Exam: Posterior pharynx pink in color with no edema  Neck Exam: Supple, trachea midline. No masses palpated. Respiratory Exam: Clear to ausculation in all lobes anterior and posterior. Cardiovascular Exam: Regular rate and rhythm, no gallops, no rubs or murmurs. No edema in extremities  Gastrointestinal Exam: Bowel sounds in all quadrants, soft, non-distended, non-tender with palpation, no guarding   Musculoskeletal Exam: No joint swelling or deformity.    Back Exam: Slight tenderness over right SI joint  Hip Exam: Full rotation bilateral  Shoulder Exam: Full rotation bilateral  Knee Exam: Full flexion and extension bilateral  Extremities: No rash, cyanosis or bruising  Neurologic Exam: Gait and coordination normal, speech normal and clear  Reflexes: Normal brachialis, Negative Puentes's bilateral. Normal Patellar bilateral,   CN EXAM: II-XII intact, face symmetrical, tongue symmetrical, the trapezius and sternocleidomastoid muscle appearance and strength symmetrical, normal achilles bilateral, ankle clonus negative bilateral  Strength: 5/5 RUE Bi's/Tri's, 5/5 LUE Bi's/Tri's, 5/5 RLE knee flex/ext, 5/5 RLE DF/PF, 5/5 LLE knee flex/ext, 5/5 LLE DF/PF  Sensation: Equal and intact to fine touch in all extremities  Mood and affect: Normal limits  Nurses note reviewed along with current vital signs    Active Problem(s)  Active Problems:    Sacroiliac joint dysfunction of right side  Resolved Problems:    * No resolved hospital problems. *                                                                                                                                 PLAN:  1. Patient is to call the office with any questions or concerns that may arise prior to next appointment. 2. Follow up prn      Urine Drug Screen Today:   Yes  []    No  [x]     Discussion:  Discussed exam findings and plan of care with patient. Patient agreed with the current plan of care at this time. All questions from the patient were answered by the provider. Activity:   Discussed exercise as beneficial to pain reduction, encouraged stretching exercise with a focus on torso strengthening. Education Provided:  Review of Carla Mort [x]  Agreement Review  [x]  Reviewed PHQ-9  [x]      Reviewed ORT [x]  Review of Test  [x]  Compliance Issues Discussed [x]   Cognitive Behavior Needs  []  Exercise  [x]  Financial Issues  []   Tobacco/Alcohol Use  []  Teaching  [x]   New Patient Picture Obtained  [x]      [] Benzodiazapine's and Narcotics:  Patient educated on the possible effects of combining Benzodiazapine's and Opioids. Explained \"Black Box Warnings\" such as; possible suppressed breathing, hypoxia, anoxia, depressed cognition, heart arrhythmia, coma and possible death. Patient verbalized understanding concerning possible effects. Controlled Substance Monitoring:   Attestation: The RAÚL report for this patient was reviewed today. Discussed with patient possible medication side effects, risk of tolerance, dependence and alternative treatments. Discussed thegrowing epidemic in the [place] with the overprescribing and at times the abuse of narcotics. Discussed the detrimental effects of long term narcotic use. Patient encouraged to set daily goals of exercising and decreasing dailynarcotic intake. Discussed with the patient about the development of hyperalgesia with long term narcotic intake.      EMR dragon/transcription disclaimer: Much of this encounter note is electronic transcription/translation of spoken language to printed tach. Electronic translation of spoken language may be erroneous, or at times, nonsensical words or phrases may be inadvertently transcribed. Although, I have reviewed the note for such errors, some may still exist.    CC:  No primary care provider on file. Thank you for this kind referral and allowing me to participate    in your patients care.     1 [facility], [facility], 9/26/2022 at 10:39 AM

## 2022-09-26 NOTE — PROGRESS NOTES
[facility]  Office Visit        Chief Complaint   Patient presents with    Follow-up     Patient is here to follow up after PM injections for sacroiliac joint pain of the right side. She states that she received relief from the first injection and helped reduce inflammation. She states that she second injection did not help as much. HISTORY OF PRESENT ILLNESS:        Interval Update:    Planned follow-up to review recent imaging and to discuss her response to right SI injections performed with pain management. She received a course of two injections 1 weeks apart. She reports that she received excellent pain relief from the first injection (>60%) and she was able to increase her activity significantly. The second injection was less effective and she feels that she was still getting relief from the first shot. HPI:    The patient is a 69 y.o. female who presents primarily for neurosurgical evaluation of right hip and lower back pain. She has a longstanding history of back problems and underwent a left-sided SI fusion in 3/2022. At that time, she complained of severe bilateral lower back/buttock/hip pain with radiation to the anterior thighs and down her legs. She also reports an extensive course of non-operative treatment prior to surgery, including multiple bilateral SI joint injections as well as multiple courses of physical therapy. She is transferring her care because her prior surgeon has been placed on an indefinite medical leave-of-absence. She reports that her left-sided symptoms improved for several months after her immediate postoperative pain subsided, however for the past few months her left sided pain has returned. She also complains of severe pain in her right sacroiliac region. The pain is worsened by prolonged sitting and movement. She has difficulty bearing weight on one leg. She has not had any recent lumbar or pelvic imaging.         MEDICAL HISTORY:             Past Medical History:   Diagnosis Date    Allergic rhinitis     Alopecia     Cervical radiculopathy     Gastritis     Hypertension     IBS (irritable bowel syndrome)     Idiopathic insomnia     Internal hemorrhoids     Irregular heart beat     Irritable bowel syndrome with both constipation and diarrhea     Seborrheic dermatitis of scalp     Thyroid disease     Trochanteric bursitis of both hips     Venous insufficiency        Past Surgical History:   Procedure Laterality Date    ARTHRODESIS  2012    C1-C2    BACK SURGERY Left 03/2022    left si joint fusion    CHOLECYSTECTOMY      COLONOSCOPY  09/12/2011    internal hemorrhoids    DILATION AND CURETTAGE OF UTERUS      HEMORRHOID SURGERY      HYSTERECTOMY (CERVIX STATUS UNKNOWN)      ovaries remain, age 32 yrs old    ROTATOR CUFF REPAIR Left     TEMPOROMANDIBULAR JOINT SURGERY Left     1990s    TUBAL LIGATION           Current Outpatient Medications:     Cholecalciferol (VITAMIN D3) 125 MCG (5000 UT) TABS, Take 1 tablet by mouth daily, Disp: , Rfl:     Omega-3 300 MG CAPS, Take 2 capsules by mouth daily, Disp: , Rfl:     Coenzyme Q10 (CO Q 10 PO), Take 1 tablet by mouth daily, Disp: , Rfl:     levothyroxine (SYNTHROID) 50 MCG tablet, Take 50 mcg by mouth daily, Disp: , Rfl:     lisinopril (PRINIVIL;ZESTRIL) 20 MG tablet, Take 20 mg by mouth daily, Disp: , Rfl:     Magnesium Gluconate 500 (27 Mg) MG TABS tablet, Take 500 mg by mouth daily, Disp: , Rfl:     Probiotic Product (PROBIOTIC DAILY PO), Take by mouth, Disp: , Rfl:     Allergies:  Patient has no known allergies. Social History:   Social History     Tobacco Use   Smoking Status Never   Smokeless Tobacco Never     Social History     Substance and Sexual Activity   Alcohol Use No         Family History:   Family History   Problem Relation Age of Onset    High Blood Pressure Mother        REVIEW OF SYSTEMS:    Constitutional: Negative. HENT: Negative. Eyes: Negative. Respiratory: Negative. Cardiovascular: Negative. Gastrointestinal: Negative. Genitourinary: Negative. Musculoskeletal:  Positive for back pain, joint pain and myalgias. Skin: Negative. Neurological:  Positive for weakness. Endo/Heme/Allergies: Negative. Psychiatric/Behavioral: Negative. Review of Systems was obtained by the medical assistant and reviewed by myself. PHYSICAL EXAM:    Vitals:    09/26/22 1145   BP: (!) 140/88   Pulse: 80   Resp: 18       Constitutional: Appears well-developed and well-nourished. Eyes - conjunctiva normal.  Pupils react to light  Ear, nose,throat -Normal pinna and tragus, No scars, or lesions over external nose or ears, no obvious atrophy of tongue  Neck- symmetric, trachea midline, no jugular vein distension  Respiration- chest wall symmetric, normal effort without use of accessory muscles  Musculoskeletal - no significant wasting of muscles noted, no bony deformities, symmetric bulk  Extremities- no clubbing, cyanosis or edema  Skin - warm, dry, and intact. No rash,erythema, or pallor.   Psychiatric - mood, affect, and behavior appear normal.       NEUROLOGIC EXAM:    MENTAL STATUS: Alert, oriented, thought content appropriate    CRANIAL NERVES: PERRL, EOMI, symmetric facies, tongue midline    MOTOR:     Right Upper Extremity:    Deltoid: 5/5  Biceps: 5/5  Triceps: 5/5  Wrist Extension: 5/5  Finger Abduction: 5/5      Left Upper Extremity:    Deltoid: 5/5  Biceps: 5/5  Triceps: 5/5  Wrist Extension: 5/5  Finger Abduction: 5/5      Right Lower Extremity:    Hip Flexion: 5/5  Knee Extension: 5/5  Ankle Plantarflexion: 5/5  Ankle Dorsiflexion: 5/5        Left Lower Extremity:    Hip Flexion: 5/5  Knee Extension: 5/5  Ankle Plantarflexion: 5/5  Ankle Dorsiflexion: 5/5      SOMATOSENSORY:     Right Upper Extremity: normal light touch sensation  Left Upper Extremity: normal light touch sensation  Right Lower Extremity: normal light touch sensation  Left Lower Extremity: normal light touch sensation      REFLEXES:    Biceps: 2+  Patella: 2+  Ankle Jerk: 1+    Puentes's: Negative      COORDINATION and GAIT:    Gait: Antalgic      PROVOCATIVE TESTS:    Straight Leg Raise: Negative on the left, weakly positive on the right (SI pain and buttock pain)  Ahmet-Finger test: Positive bilaterally  SI palpation: Strongly positive bilaterally  LIGIA: Positive bilaterally, R>L  Thigh Thrust: Positive on the right, negative on the left  Distraction: Weakly positive (non-radiating pain at site of palpation)      IMAGING:    My interpretation of imaging studies:     CT of the pelvis reviewed. There is no destructive or mass lesion. There are postsurgical changes after left SI fusion with two threaded screw-type implants. There is no evidence of hardware failure or lucency to suggest pseudoarthrosis. There is degenerative sclerosis of the right SI joint and mild bilateral hip DJD. ASSESSMENT AND PLAN:    This is a 69 y.o. female who returns to review her recent imaging and discuss her response to right SI joint injections. She had an excellent response to her first injection and reports >60% pain relief. She was able to increase her activity significantly after that injection. She has undergone a CT of her Pelvis that show appropriate postsurgical changes after her prior left SI fusion and evidence of degenerative changes in the right SI joint. She is not getting much benefit from physical therapy. I explained that given her good response to her previous SI fusion, her similar symptoms on the right side and her good response to her recent injection, I feel she is a good candidate for surgery (right SI fusion). The procedure was explained to her in detail using anatomic models. Specific risks of bleeding, infection, neurologic injury, implant misplacement, hardware failure, pseudoarthrosis and pelvic fracture were all discussed.   I also explained that this is a relatively new surgery at [facility] and that a proctoring surgeon would be present for the procedure. She voiced understanding and requested that we proceed with surgery.             Susan Ojeda MD

## 2022-09-26 NOTE — PROGRESS NOTES
Clinic Documentation      Education Provided:  [x] Review of Amor Gums  [] Agreement Review  [x] PEG Score Calculated [] PHQ Score Calculated [] ORT Score Calculated    [] Compliance Issues Discussed [] Cognitive Behavior Needs [x] Exercise [] Review of Test [] Financial Issues  [x] Tobacco/Alcohol Use Reviewed [x] Teaching [] New Patient [] Picture Obtained    Physician Plan:  [] Outgoing Referral  [] Pharmacy Consult  [] Test Ordered [] Prescription Ordered/Changed   [] Obtained Test Results / Consult Notes        Complete if patient is withholding blood thinner for procedure     Blood Thinner Patient is currently taking:      [] Plavix (Hold for 7 days)  [] Aspirin (Hold for 5 days)     [] Pletal (Hold for 2 days)  [] Pradaxa (Hold for 3 days)    [] Effient (Hold for 7 days)  [] Xarelto (Hold for 2 days)    [] Eliquis (Hold for 2 days)  [] Brilinta (Hold for 7 days)    [] Coumadin (Hold for 5 days) - (INR needs to be drawn the day prior to procedure- INR < 2.0)    [] Aggrenox (Hold for 7 days)        [] Patient will stop medication on their own.    [] Blood Thinner Form Faxed for approval to hold.    Provider form faxed to:     Assessment Completed by:  Electronically signed by Marifer Swift RN on 9/26/2022 at 10:11 AM

## 2022-09-29 ENCOUNTER — TELEPHONE (OUTPATIENT)
Dept: PAIN MANAGEMENT | Age: 67
End: 2022-09-29

## 2022-10-19 ENCOUNTER — TELEPHONE (OUTPATIENT)
Dept: NEUROSURGERY | Age: 67
End: 2022-10-19

## 2022-10-19 NOTE — TELEPHONE ENCOUNTER
Patient called stating that she would like to talk to Dr Geraldo Ormond in regards to her Pain Management injections. Patient was transferred to Dr Geraldo Ormond.

## 2022-10-19 NOTE — TELEPHONE ENCOUNTER
NEUROSURGERY    Patient called to discuss the results of her injections with pain management. She states that both her first and second injection provided ~85% relief of the pain she was experiencing prior to the injection. She states that she was able to significantly increase her activity after the injections. She states that the result from these injections is similar to what she experienced prior to her previous left SI Fusion and that when she proceeded to surgery she had excellent relief of her pain.

## 2022-10-25 DIAGNOSIS — Z01.818 PRE-OP TESTING: Primary | ICD-10-CM

## 2022-10-28 ENCOUNTER — HOSPITAL ENCOUNTER (OUTPATIENT)
Dept: GENERAL RADIOLOGY | Age: 67
Discharge: HOME | End: 2022-10-28
Payer: MEDICARE

## 2022-10-28 ENCOUNTER — HOSPITAL ENCOUNTER (OUTPATIENT)
Dept: PREADMISSION TESTING | Age: 67
Discharge: HOME | End: 2022-11-01
Payer: MEDICARE

## 2022-10-28 VITALS — BODY MASS INDEX: 30.82 KG/M2 | WEIGHT: 174 LBS

## 2022-10-28 DIAGNOSIS — Z01.818 PRE-OP TESTING: ICD-10-CM

## 2022-10-28 LAB
ALBUMIN SERPL-MCNC: 4.3 G/DL (ref 3.5–5.2)
ALP BLD-CCNC: 90 U/L (ref 35–104)
ALT SERPL-CCNC: 17 U/L (ref 5–33)
ANION GAP SERPL CALCULATED.3IONS-SCNC: 11 MMOL/L (ref 7–19)
APTT: 27.6 SEC (ref 26–36.2)
AST SERPL-CCNC: 18 U/L (ref 5–32)
BASOPHILS ABSOLUTE: 0 K/UL (ref 0–0.2)
BASOPHILS RELATIVE PERCENT: 0.3 % (ref 0–1)
BILIRUB SERPL-MCNC: 0.3 MG/DL (ref 0.2–1.2)
BILIRUBIN URINE: NEGATIVE
BLOOD, URINE: NEGATIVE
BUN BLDV-MCNC: 16 MG/DL (ref 8–23)
CALCIUM SERPL-MCNC: 10.1 MG/DL (ref 8.8–10.2)
CHLORIDE BLD-SCNC: 101 MMOL/L (ref 98–111)
CLARITY: CLEAR
CO2: 27 MMOL/L (ref 22–29)
COLOR: YELLOW
CREAT SERPL-MCNC: 0.7 MG/DL (ref 0.5–0.9)
EKG P AXIS: 38 DEGREES
EKG P-R INTERVAL: 168 MS
EKG Q-T INTERVAL: 414 MS
EKG QRS DURATION: 90 MS
EKG QTC CALCULATION (BAZETT): 417 MS
EKG T AXIS: 29 DEGREES
EOSINOPHILS ABSOLUTE: 0.1 K/UL (ref 0–0.6)
EOSINOPHILS RELATIVE PERCENT: 0.8 % (ref 0–5)
GFR SERPL CREATININE-BSD FRML MDRD: >60 ML/MIN/{1.73_M2}
GLUCOSE BLD-MCNC: 83 MG/DL (ref 74–109)
GLUCOSE URINE: NEGATIVE MG/DL
HCT VFR BLD CALC: 41.2 % (ref 37–47)
HEMOGLOBIN: 13.6 G/DL (ref 12–16)
IMMATURE GRANULOCYTES #: 0 K/UL
INR BLD: 0.92 (ref 0.88–1.18)
KETONES, URINE: NEGATIVE MG/DL
LEUKOCYTE ESTERASE, URINE: NEGATIVE
LYMPHOCYTES ABSOLUTE: 2.2 K/UL (ref 1.1–4.5)
LYMPHOCYTES RELATIVE PERCENT: 33.1 % (ref 20–40)
MCH RBC QN AUTO: 30.8 PG (ref 27–31)
MCHC RBC AUTO-ENTMCNC: 33 G/DL (ref 33–37)
MCV RBC AUTO: 93.2 FL (ref 81–99)
MONOCYTES ABSOLUTE: 0.4 K/UL (ref 0–0.9)
MONOCYTES RELATIVE PERCENT: 6.1 % (ref 0–10)
NEUTROPHILS ABSOLUTE: 3.9 K/UL (ref 1.5–7.5)
NEUTROPHILS RELATIVE PERCENT: 59.5 % (ref 50–65)
NITRITE, URINE: NEGATIVE
PDW BLD-RTO: 12.9 % (ref 11.5–14.5)
PH UA: 5 (ref 5–8)
PLATELET # BLD: 215 K/UL (ref 130–400)
PMV BLD AUTO: 9.9 FL (ref 9.4–12.3)
POTASSIUM SERPL-SCNC: 3.9 MMOL/L (ref 3.5–5)
PROTEIN UA: NEGATIVE MG/DL
PROTHROMBIN TIME: 12.2 SEC (ref 12–14.6)
RBC # BLD: 4.42 M/UL (ref 4.2–5.4)
SODIUM BLD-SCNC: 139 MMOL/L (ref 136–145)
SPECIFIC GRAVITY UA: 1.01 (ref 1–1.03)
TOTAL PROTEIN: 7.5 G/DL (ref 6.6–8.7)
UROBILINOGEN, URINE: 0.2 E.U./DL
WBC # BLD: 6.6 K/UL (ref 4.8–10.8)

## 2022-10-28 PROCEDURE — 71046 X-RAY EXAM CHEST 2 VIEWS: CPT

## 2022-10-28 PROCEDURE — 85610 PROTHROMBIN TIME: CPT

## 2022-10-28 PROCEDURE — 93010 ELECTROCARDIOGRAM REPORT: CPT | Performed by: INTERNAL MEDICINE

## 2022-10-28 PROCEDURE — 81003 URINALYSIS AUTO W/O SCOPE: CPT

## 2022-10-28 PROCEDURE — 85025 COMPLETE CBC W/AUTO DIFF WBC: CPT

## 2022-10-28 PROCEDURE — 80053 COMPREHEN METABOLIC PANEL: CPT

## 2022-10-28 PROCEDURE — 71046 X-RAY EXAM CHEST 2 VIEWS: CPT | Performed by: RADIOLOGY

## 2022-10-28 PROCEDURE — 93005 ELECTROCARDIOGRAM TRACING: CPT | Performed by: NEUROLOGICAL SURGERY

## 2022-10-28 PROCEDURE — 85730 THROMBOPLASTIN TIME PARTIAL: CPT

## 2022-10-28 NOTE — DISCHARGE INSTRUCTIONS
The day before your surgery, you will receive a phone call from the surgery nurse, to let you know what time to arrive on the day of surgery. This call will usually be between 2-4 PM.  If you do not receive a phone call by 4 PM the day before your surgery, please call [phone] and let them know you have not received an arrival time. If your surgery is on Monday, your call will be on the Friday before your Monday surgery. The morning of surgery, you may take all your prescribed medications with a sip of water. Any exceptions to this would be listed below:    LISINOPRIL    PREOPERATIVE GUIDELINES WHEN RECEIVING ANESTHESIA    Do not eat or drink anything after midnight, the night before your surgery. This is extremely important for your safety. Take a bath (or shower) the night before your surgery and you may brush your teeth the morning of your surgery. You will be scheduled to arrive at the hospital 2 hours before your surgery, or follow your surgeon's instructions. Dress comfortably. Wear loose clothing that will be easy to remove and comfortable for your trip home. You may wear eyeglasses or contacts but bring your cases with you as they must be remove before your surgery. Hearing aids and dentures will need to be removed before your surgery. Do not wear any jewelry, including body jewelry. All jewelry will need to be removed prior to your surgery. Do not wear fingernail polish or make-up. It is best not to bring any valuables with you. If you are to stay in the hospital overnight, bring your robe, slippers and personal toiletries that you may need. POSTOPERATIVE GUIDELINES AFTER RECEIVING ANESTHESIA    If you are to go home after your surgery, you will need a responsible adult to drive you home. You will not be able to take public transportation after your discharge from the Operative Care Unit unless you are accompanied by a        responsible adult.     On returning home, be sure to follow your physician's orders regarding diet, activity and medications. Remember, surgery with general anesthesia or sedation may leave you sleepy, very tired and with a decreased appetite for 12 to 24 hours. If you develop any post-surgical complications or problems, call your surgeon or [facility] Emergency Department ([phone]). [address] [place] [address] for Surgery Patients-Revised 6-    Visitors for surgery patients are essential for the patient's emotional well-being and care       post operatively. 2.   Visitor Expectations and Limitations        VISITORS MUST WEAR MASKS AT ALL TIMES. NO Cloth masks allowed. 3.  One visitor allowed with patients in the preop/postop rooms. 4.  A second visitor may sit in the waiting area. 5.  No children under 13 allowed in the pre-post op areas unless they are the patient. 6.  Two people may be with an underage surgical/procedural patient in preop/postop        room. 7.  If you are admitted to the hospital post operatively, there are NO RESTRICTIONS on       the floor at this time. 8.  If you are admitted to ICU postoperatively, you may have one visitor in the room from        7A-7P. A second visitor may sit in the ICU waiting room.   There can be no overnight

## 2022-10-31 ENCOUNTER — ANESTHESIA EVENT (OUTPATIENT)
Dept: OPERATING ROOM | Age: 67
End: 2022-10-31
Payer: MEDICARE

## 2022-11-01 ENCOUNTER — APPOINTMENT (OUTPATIENT)
Dept: GENERAL RADIOLOGY | Age: 67
End: 2022-11-01
Attending: NEUROLOGICAL SURGERY
Payer: MEDICARE

## 2022-11-01 ENCOUNTER — HOSPITAL ENCOUNTER (OUTPATIENT)
Age: 67
Setting detail: OUTPATIENT SURGERY
Discharge: HOME | End: 2022-11-01
Attending: NEUROLOGICAL SURGERY | Admitting: NEUROLOGICAL SURGERY
Payer: MEDICARE

## 2022-11-01 ENCOUNTER — ANESTHESIA (OUTPATIENT)
Dept: OPERATING ROOM | Age: 67
End: 2022-11-01
Payer: MEDICARE

## 2022-11-01 VITALS
HEART RATE: 59 BPM | RESPIRATION RATE: 14 BRPM | SYSTOLIC BLOOD PRESSURE: 135 MMHG | OXYGEN SATURATION: 98 % | DIASTOLIC BLOOD PRESSURE: 77 MMHG | BODY MASS INDEX: 30.48 KG/M2 | WEIGHT: 172 LBS | TEMPERATURE: 98 F | HEIGHT: 63 IN

## 2022-11-01 DIAGNOSIS — M53.3 SACROILIAC JOINT DYSFUNCTION OF RIGHT SIDE: Primary | ICD-10-CM

## 2022-11-01 PROCEDURE — 2500000003 HC RX 250 WO HCPCS: Performed by: NURSE ANESTHETIST, CERTIFIED REGISTERED

## 2022-11-01 PROCEDURE — C1776 JOINT DEVICE (IMPLANTABLE): HCPCS | Performed by: NEUROLOGICAL SURGERY

## 2022-11-01 PROCEDURE — 2720000010 HC SURG SUPPLY STERILE: Performed by: NEUROLOGICAL SURGERY

## 2022-11-01 PROCEDURE — 2580000003 HC RX 258: Performed by: ANESTHESIOLOGY

## 2022-11-01 PROCEDURE — 2709999900 HC NON-CHARGEABLE SUPPLY: Performed by: NEUROLOGICAL SURGERY

## 2022-11-01 PROCEDURE — 6360000002 HC RX W HCPCS: Performed by: NURSE ANESTHETIST, CERTIFIED REGISTERED

## 2022-11-01 PROCEDURE — 3600000014 HC SURGERY LEVEL 4 ADDTL 15MIN: Performed by: NEUROLOGICAL SURGERY

## 2022-11-01 PROCEDURE — C1713 ANCHOR/SCREW BN/BN,TIS/BN: HCPCS | Performed by: NEUROLOGICAL SURGERY

## 2022-11-01 PROCEDURE — 7100000000 HC PACU RECOVERY - FIRST 15 MIN: Performed by: NEUROLOGICAL SURGERY

## 2022-11-01 PROCEDURE — 2500000003 HC RX 250 WO HCPCS: Performed by: NEUROLOGICAL SURGERY

## 2022-11-01 PROCEDURE — 72202 X-RAY EXAM SI JOINTS 3/> VWS: CPT

## 2022-11-01 PROCEDURE — 3700000001 HC ADD 15 MINUTES (ANESTHESIA): Performed by: NEUROLOGICAL SURGERY

## 2022-11-01 PROCEDURE — 7100000001 HC PACU RECOVERY - ADDTL 15 MIN: Performed by: NEUROLOGICAL SURGERY

## 2022-11-01 PROCEDURE — 6360000002 HC RX W HCPCS: Performed by: NEUROLOGICAL SURGERY

## 2022-11-01 PROCEDURE — C9290 INJ, BUPIVACAINE LIPOSOME: HCPCS | Performed by: NEUROLOGICAL SURGERY

## 2022-11-01 PROCEDURE — 6370000000 HC RX 637 (ALT 250 FOR IP): Performed by: NEUROLOGICAL SURGERY

## 2022-11-01 PROCEDURE — 7100000010 HC PHASE II RECOVERY - FIRST 15 MIN: Performed by: NEUROLOGICAL SURGERY

## 2022-11-01 PROCEDURE — 27279 ARTHRD SI JT PLMT TARTCLR DV: CPT | Performed by: NEUROLOGICAL SURGERY

## 2022-11-01 PROCEDURE — 3700000000 HC ANESTHESIA ATTENDED CARE: Performed by: NEUROLOGICAL SURGERY

## 2022-11-01 PROCEDURE — 6370000000 HC RX 637 (ALT 250 FOR IP): Performed by: ANESTHESIOLOGY

## 2022-11-01 PROCEDURE — 2580000003 HC RX 258: Performed by: NEUROLOGICAL SURGERY

## 2022-11-01 PROCEDURE — 7100000011 HC PHASE II RECOVERY - ADDTL 15 MIN: Performed by: NEUROLOGICAL SURGERY

## 2022-11-01 PROCEDURE — 3600000004 HC SURGERY LEVEL 4 BASE: Performed by: NEUROLOGICAL SURGERY

## 2022-11-01 DEVICE — I-FACTOR PUTTY, 2.5CC SYRINGE
Type: IMPLANTABLE DEVICE | Site: SACRUM | Status: FUNCTIONAL
Brand: I-FACTOR PEPTIDE ENHANCED BONE GRAFT

## 2022-11-01 DEVICE — IMPLANTABLE DEVICE
Type: IMPLANTABLE DEVICE | Site: SACRUM | Status: FUNCTIONAL
Brand: IFUSE IMPLANT SYSTEM

## 2022-11-01 DEVICE — IMPLANT SPNL L40MM DIA7MM SACROILIAC JT TI POR PLSM SPR: Type: IMPLANTABLE DEVICE | Site: SACRUM | Status: FUNCTIONAL

## 2022-11-01 RX ORDER — HYDROCODONE BITARTRATE AND ACETAMINOPHEN 7.5; 325 MG/1; MG/1
1-2 TABLET ORAL EVERY 6 HOURS PRN
Qty: 60 TABLET | Refills: 0 | Status: SHIPPED | OUTPATIENT
Start: 2022-11-01 | End: 2022-11-15

## 2022-11-01 RX ORDER — EPHEDRINE SULFATE 50 MG/ML
INJECTION, SOLUTION INTRAVENOUS PRN
Status: DISCONTINUED | OUTPATIENT
Start: 2022-11-01 | End: 2022-11-01 | Stop reason: SDUPTHER

## 2022-11-01 RX ORDER — SODIUM CHLORIDE 0.9 % (FLUSH) 0.9 %
5-40 SYRINGE (ML) INJECTION EVERY 12 HOURS SCHEDULED
Status: DISCONTINUED | OUTPATIENT
Start: 2022-11-01 | End: 2022-11-01 | Stop reason: HOSPADM

## 2022-11-01 RX ORDER — MIDAZOLAM HYDROCHLORIDE 2 MG/2ML
2 INJECTION, SOLUTION INTRAMUSCULAR; INTRAVENOUS
Status: DISCONTINUED | OUTPATIENT
Start: 2022-11-01 | End: 2022-11-01 | Stop reason: HOSPADM

## 2022-11-01 RX ORDER — LIDOCAINE HYDROCHLORIDE 20 MG/ML
INJECTION, SOLUTION EPIDURAL; INFILTRATION; INTRACAUDAL; PERINEURAL PRN
Status: DISCONTINUED | OUTPATIENT
Start: 2022-11-01 | End: 2022-11-01 | Stop reason: ALTCHOICE

## 2022-11-01 RX ORDER — METOCLOPRAMIDE HYDROCHLORIDE 5 MG/ML
10 INJECTION INTRAMUSCULAR; INTRAVENOUS
Status: DISCONTINUED | OUTPATIENT
Start: 2022-11-01 | End: 2022-11-01 | Stop reason: HOSPADM

## 2022-11-01 RX ORDER — DEXAMETHASONE SODIUM PHOSPHATE 10 MG/ML
INJECTION, SOLUTION INTRAMUSCULAR; INTRAVENOUS PRN
Status: DISCONTINUED | OUTPATIENT
Start: 2022-11-01 | End: 2022-11-01 | Stop reason: SDUPTHER

## 2022-11-01 RX ORDER — FENTANYL CITRATE 50 UG/ML
INJECTION, SOLUTION INTRAMUSCULAR; INTRAVENOUS PRN
Status: DISCONTINUED | OUTPATIENT
Start: 2022-11-01 | End: 2022-11-01 | Stop reason: SDUPTHER

## 2022-11-01 RX ORDER — DEXMEDETOMIDINE HYDROCHLORIDE 100 UG/ML
INJECTION, SOLUTION INTRAVENOUS PRN
Status: DISCONTINUED | OUTPATIENT
Start: 2022-11-01 | End: 2022-11-01 | Stop reason: SDUPTHER

## 2022-11-01 RX ORDER — SCOLOPAMINE TRANSDERMAL SYSTEM 1 MG/1
1 PATCH, EXTENDED RELEASE TRANSDERMAL
Status: DISCONTINUED | OUTPATIENT
Start: 2022-11-01 | End: 2022-11-01 | Stop reason: HOSPADM

## 2022-11-01 RX ORDER — HYDROMORPHONE HYDROCHLORIDE 1 MG/ML
0.25 INJECTION, SOLUTION INTRAMUSCULAR; INTRAVENOUS; SUBCUTANEOUS EVERY 5 MIN PRN
Status: DISCONTINUED | OUTPATIENT
Start: 2022-11-01 | End: 2022-11-01 | Stop reason: HOSPADM

## 2022-11-01 RX ORDER — SODIUM CHLORIDE 0.9 % (FLUSH) 0.9 %
5-40 SYRINGE (ML) INJECTION PRN
Status: DISCONTINUED | OUTPATIENT
Start: 2022-11-01 | End: 2022-11-01 | Stop reason: HOSPADM

## 2022-11-01 RX ORDER — SODIUM CHLORIDE, SODIUM LACTATE, POTASSIUM CHLORIDE, CALCIUM CHLORIDE 600; 310; 30; 20 MG/100ML; MG/100ML; MG/100ML; MG/100ML
INJECTION, SOLUTION INTRAVENOUS CONTINUOUS
Status: DISCONTINUED | OUTPATIENT
Start: 2022-11-01 | End: 2022-11-01 | Stop reason: HOSPADM

## 2022-11-01 RX ORDER — SODIUM CHLORIDE 9 MG/ML
INJECTION, SOLUTION INTRAVENOUS PRN
Status: DISCONTINUED | OUTPATIENT
Start: 2022-11-01 | End: 2022-11-01 | Stop reason: HOSPADM

## 2022-11-01 RX ORDER — MEPERIDINE HYDROCHLORIDE 25 MG/ML
12.5 INJECTION INTRAMUSCULAR; INTRAVENOUS; SUBCUTANEOUS EVERY 5 MIN PRN
Status: DISCONTINUED | OUTPATIENT
Start: 2022-11-01 | End: 2022-11-01 | Stop reason: HOSPADM

## 2022-11-01 RX ORDER — DIPHENHYDRAMINE HYDROCHLORIDE 50 MG/ML
12.5 INJECTION INTRAMUSCULAR; INTRAVENOUS
Status: DISCONTINUED | OUTPATIENT
Start: 2022-11-01 | End: 2022-11-01 | Stop reason: HOSPADM

## 2022-11-01 RX ORDER — LIDOCAINE HYDROCHLORIDE 10 MG/ML
INJECTION, SOLUTION INFILTRATION; PERINEURAL PRN
Status: DISCONTINUED | OUTPATIENT
Start: 2022-11-01 | End: 2022-11-01 | Stop reason: SDUPTHER

## 2022-11-01 RX ORDER — BUPIVACAINE HYDROCHLORIDE AND EPINEPHRINE 5; 5 MG/ML; UG/ML
INJECTION, SOLUTION PERINEURAL PRN
Status: DISCONTINUED | OUTPATIENT
Start: 2022-11-01 | End: 2022-11-01 | Stop reason: ALTCHOICE

## 2022-11-01 RX ORDER — ROCURONIUM BROMIDE 10 MG/ML
INJECTION, SOLUTION INTRAVENOUS PRN
Status: DISCONTINUED | OUTPATIENT
Start: 2022-11-01 | End: 2022-11-01 | Stop reason: SDUPTHER

## 2022-11-01 RX ORDER — ONDANSETRON 2 MG/ML
INJECTION INTRAMUSCULAR; INTRAVENOUS PRN
Status: DISCONTINUED | OUTPATIENT
Start: 2022-11-01 | End: 2022-11-01 | Stop reason: SDUPTHER

## 2022-11-01 RX ORDER — PROPOFOL 10 MG/ML
INJECTION, EMULSION INTRAVENOUS PRN
Status: DISCONTINUED | OUTPATIENT
Start: 2022-11-01 | End: 2022-11-01 | Stop reason: SDUPTHER

## 2022-11-01 RX ORDER — HYDROMORPHONE HYDROCHLORIDE 1 MG/ML
0.5 INJECTION, SOLUTION INTRAMUSCULAR; INTRAVENOUS; SUBCUTANEOUS EVERY 5 MIN PRN
Status: COMPLETED | OUTPATIENT
Start: 2022-11-01 | End: 2022-11-01

## 2022-11-01 RX ORDER — LIDOCAINE HYDROCHLORIDE 10 MG/ML
1 INJECTION, SOLUTION EPIDURAL; INFILTRATION; INTRACAUDAL; PERINEURAL
Status: DISCONTINUED | OUTPATIENT
Start: 2022-11-01 | End: 2022-11-01 | Stop reason: HOSPADM

## 2022-11-01 RX ORDER — FAMOTIDINE 20 MG/1
20 TABLET, FILM COATED ORAL ONCE
Status: COMPLETED | OUTPATIENT
Start: 2022-11-01 | End: 2022-11-01

## 2022-11-01 RX ORDER — POLYMYXIN B 500000 [USP'U]/1
INJECTION, POWDER, LYOPHILIZED, FOR SOLUTION INTRAMUSCULAR; INTRATHECAL; INTRAVENOUS; OPHTHALMIC PRN
Status: DISCONTINUED | OUTPATIENT
Start: 2022-11-01 | End: 2022-11-01 | Stop reason: ALTCHOICE

## 2022-11-01 RX ORDER — HYDROCODONE BITARTRATE AND ACETAMINOPHEN 5; 325 MG/1; MG/1
1 TABLET ORAL ONCE
Status: COMPLETED | OUTPATIENT
Start: 2022-11-01 | End: 2022-11-01

## 2022-11-01 RX ADMIN — PROPOFOL 160 MG: 10 INJECTION, EMULSION INTRAVENOUS at 09:27

## 2022-11-01 RX ADMIN — HYDROMORPHONE HYDROCHLORIDE 0.5 MG: 1 INJECTION, SOLUTION INTRAMUSCULAR; INTRAVENOUS; SUBCUTANEOUS at 12:16

## 2022-11-01 RX ADMIN — FENTANYL CITRATE 50 MCG: 50 INJECTION, SOLUTION INTRAMUSCULAR; INTRAVENOUS at 09:26

## 2022-11-01 RX ADMIN — EPHEDRINE SULFATE 10 MG: 50 INJECTION INTRAMUSCULAR; INTRAVENOUS; SUBCUTANEOUS at 10:39

## 2022-11-01 RX ADMIN — HYDROMORPHONE HYDROCHLORIDE 0.5 MG: 1 INJECTION, SOLUTION INTRAMUSCULAR; INTRAVENOUS; SUBCUTANEOUS at 11:57

## 2022-11-01 RX ADMIN — DEXMEDETOMIDINE HYDROCHLORIDE 10 MCG: 100 INJECTION, SOLUTION, CONCENTRATE INTRAVENOUS at 09:24

## 2022-11-01 RX ADMIN — HYDROMORPHONE HYDROCHLORIDE 0.5 MG: 1 INJECTION, SOLUTION INTRAMUSCULAR; INTRAVENOUS; SUBCUTANEOUS at 11:24

## 2022-11-01 RX ADMIN — ROCURONIUM BROMIDE 10 MG: 10 INJECTION, SOLUTION INTRAVENOUS at 10:43

## 2022-11-01 RX ADMIN — ROCURONIUM BROMIDE 40 MG: 10 INJECTION, SOLUTION INTRAVENOUS at 09:28

## 2022-11-01 RX ADMIN — DEXAMETHASONE SODIUM PHOSPHATE 8 MG: 10 INJECTION, SOLUTION INTRAMUSCULAR; INTRAVENOUS at 09:40

## 2022-11-01 RX ADMIN — CEFAZOLIN 2000 MG: 2 INJECTION, POWDER, FOR SOLUTION INTRAMUSCULAR; INTRAVENOUS at 09:39

## 2022-11-01 RX ADMIN — FENTANYL CITRATE 25 MCG: 50 INJECTION, SOLUTION INTRAMUSCULAR; INTRAVENOUS at 11:03

## 2022-11-01 RX ADMIN — LIDOCAINE HYDROCHLORIDE 50 MG: 10 INJECTION, SOLUTION INFILTRATION; PERINEURAL at 09:27

## 2022-11-01 RX ADMIN — FAMOTIDINE 20 MG: 20 TABLET ORAL at 08:14

## 2022-11-01 RX ADMIN — SUGAMMADEX 200 MG: 100 INJECTION, SOLUTION INTRAVENOUS at 11:14

## 2022-11-01 RX ADMIN — ONDANSETRON 4 MG: 2 INJECTION INTRAMUSCULAR; INTRAVENOUS at 10:29

## 2022-11-01 RX ADMIN — SODIUM CHLORIDE, SODIUM LACTATE, POTASSIUM CHLORIDE, AND CALCIUM CHLORIDE: 600; 310; 30; 20 INJECTION, SOLUTION INTRAVENOUS at 08:13

## 2022-11-01 RX ADMIN — SODIUM CHLORIDE, SODIUM LACTATE, POTASSIUM CHLORIDE, AND CALCIUM CHLORIDE: 600; 310; 30; 20 INJECTION, SOLUTION INTRAVENOUS at 10:40

## 2022-11-01 RX ADMIN — FENTANYL CITRATE 25 MCG: 50 INJECTION, SOLUTION INTRAMUSCULAR; INTRAVENOUS at 10:42

## 2022-11-01 RX ADMIN — HYDROMORPHONE HYDROCHLORIDE 0.5 MG: 1 INJECTION, SOLUTION INTRAMUSCULAR; INTRAVENOUS; SUBCUTANEOUS at 11:35

## 2022-11-01 RX ADMIN — HYDROCODONE BITARTRATE AND ACETAMINOPHEN 1 TABLET: 5; 325 TABLET ORAL at 13:20

## 2022-11-01 ASSESSMENT — PAIN DESCRIPTION - ORIENTATION
ORIENTATION: RIGHT

## 2022-11-01 ASSESSMENT — LIFESTYLE VARIABLES: SMOKING_STATUS: 0

## 2022-11-01 ASSESSMENT — PAIN DESCRIPTION - FREQUENCY: FREQUENCY: CONTINUOUS

## 2022-11-01 ASSESSMENT — PAIN DESCRIPTION - LOCATION
LOCATION: HIP

## 2022-11-01 ASSESSMENT — PAIN SCALES - GENERAL
PAINLEVEL_OUTOF10: 8
PAINLEVEL_OUTOF10: 3
PAINLEVEL_OUTOF10: 9
PAINLEVEL_OUTOF10: 6
PAINLEVEL_OUTOF10: 9

## 2022-11-01 ASSESSMENT — PAIN DESCRIPTION - DESCRIPTORS
DESCRIPTORS: ACHING
DESCRIPTORS: ACHING

## 2022-11-01 ASSESSMENT — PAIN DESCRIPTION - PAIN TYPE: TYPE: SURGICAL PAIN

## 2022-11-01 ASSESSMENT — PAIN - FUNCTIONAL ASSESSMENT: PAIN_FUNCTIONAL_ASSESSMENT: NONE - DENIES PAIN

## 2022-11-01 NOTE — ANESTHESIA POSTPROCEDURE EVALUATION
Department of Anesthesiology  Postprocedure Note    Patient: Cory Hernandez  MRN: [id number]  YOB: 1955  Date of evaluation: 11/1/2022      Procedure Summary     Date: 11/01/22 Room / Location: [address]    Anesthesia Start: 6440 Anesthesia Stop: 1148    Procedure: RIGHT SACROILIAC JOINT FUSION (Right: Sacrum) Diagnosis:       Sacroiliac joint dysfunction      (Sacroiliac joint dysfunction [M53.3])    Surgeons: Valerie Concepcion MD Responsible Provider: AMADOR Aponte CRNA    Anesthesia Type: general ASA Status: 2          Anesthesia Type: No value filed.     Rebecca Phase I: Rebecca Score: 10    Rebecca Phase II:        Anesthesia Post Evaluation    Patient location during evaluation: PACU  Patient participation: waiting for patient participation  Level of consciousness: awake and lethargic  Pain score: 6  Airway patency: patent  Nausea & Vomiting: no nausea and no vomiting  Complications: no  Cardiovascular status: blood pressure returned to baseline  Respiratory status: acceptable  Hydration status: euvolemic  Comments: Report to RN

## 2022-11-01 NOTE — OP NOTE
NEUROSURGICAL OPERATIVE REPORT      DATE OF PROCEDURE: 11/1/2022       NEUROSURGICAL OPERATIVE REPORT     ATTENDING SURGEON:  Sunny Mackay. Shilpa Espino MD, PhD     PREOPERATIVE DIAGNOSIS:  Right sacroiliac joint dysfunction. POSTOPERATIVE DIAGNOSIS:  Right sacroiliac joint dysfunction. PROCEDURES PERFORMED:  1. Minimally invasive right sacroiliac joint fusion using the SI-BONE iFuse system and i-FACTOR DBM. 2.  Intraoperative fluoroscopy with interpretation. INDICATIONS:  Right SI joint dysfunction refractory to conservative management     ESTIMATED BLOOD LOSS:  50 mL. PROCEDURE IN DETAIL:  The patient was identified in the preoperative area, consent for surgery was confirmed and the site for surgery was marked. The patient was transferred to the operating room by the anesthesia team where general endotracheal anesthesia was induced without difficulty. The patient was then flipped into the prone position on the Axis table and all pressure points were carefully padded. We then used lateral, pelvic inlet and pelvic outlet views to localize our incision. The incision was planned by marking a line perpendicular alar line parallel to the mid-body of the sacrum. And approximately 3 cm incision was then marked on the skin. The pelvis, buttocks, and back were then prepped and draped in the usual aseptic fashion. A team pause was held according to the preformatted script, all were in agreement and the decision was made to proceed with surgery. The planned incision was infiltrated with local anesthetic and opened sharply with a skin knife and carried down to the subcutaneous tissues. Hemostasis was obtained with electrocautery. We then obtained a guide pin from the iFuse set and under fluoroscopic guidance, this was impacted into the lateral cortex of the ilium approximately 1 cm inferior to the alar line at the level of the mid sacral body.   Once the pin was docked in the cortex of the iliac wing, we repeated the procedure identically a third time in the intervening bone between the S1 and S2 foramina. The third implant was 7 x 35 mm size. Once all implants were in place, we removed our final guide pin and left the implants in place. The wound was irrigated. We then checked a 15-degree oblique outlet view and confirmed that all implants were across the SI joint and in appropriate orientation. We then again confirmed with standard inlet and outlet and lateral views and we were very happy with the position of our implants. We then irrigated the wound. The muscle was injected with Exparel and closed in layers with absorbable suture. The wound was then cleaned and dried and sealed with Dermabond skin glue. Once the Dermabond had dried, the drapes were removed and the patient was flipped into the supine position where control of the operation was returned to Anesthesia for extubation and transport to Recovery. All sponge, needle, and instrument counts were correct on two iterations at the end of the procedure and there were no apparent complications.

## 2022-11-01 NOTE — H&P
[facility] Neurosurgery  History and Physical        Chief Complaint   Patient presents with    Follow-up     Patient is here to follow up after PM injections for sacroiliac joint pain of the right side. She states that she received relief from the first injection and helped reduce inflammation. She states that she second injection did not help as much. HISTORY OF PRESENT ILLNESS:        Interval Update:    Planned follow-up to review recent imaging and to discuss her response to right SI injections performed with pain management. She received a course of two injections 1 weeks apart. She reports that she received excellent pain relief from the first injection (>60%) and she was able to increase her activity significantly. The second injection was less effective and she feels that she was still getting relief from the first shot. HPI:    The patient is a 69 y.o. female who presents primarily for neurosurgical evaluation of right hip and lower back pain. She has a longstanding history of back problems and underwent a left-sided SI fusion in 3/2022. At that time, she complained of severe bilateral lower back/buttock/hip pain with radiation to the anterior thighs and down her legs. She also reports an extensive course of non-operative treatment prior to surgery, including multiple bilateral SI joint injections as well as multiple courses of physical therapy. She is transferring her care because her prior surgeon has been placed on an indefinite medical leave-of-absence. She reports that her left-sided symptoms improved for several months after her immediate postoperative pain subsided, however for the past few months her left sided pain has returned. She also complains of severe pain in her right sacroiliac region. The pain is worsened by prolonged sitting and movement. She has difficulty bearing weight on one leg. She has not had any recent lumbar or pelvic imaging.         MEDICAL HISTORY: Past Medical History:   Diagnosis Date    Allergic rhinitis     Alopecia     Cervical radiculopathy     Gastritis     Hypertension     IBS (irritable bowel syndrome)     Idiopathic insomnia     Internal hemorrhoids     Irregular heart beat     Irritable bowel syndrome with both constipation and diarrhea     Seborrheic dermatitis of scalp     Thyroid disease     Trochanteric bursitis of both hips     Venous insufficiency        Past Surgical History:   Procedure Laterality Date    ARTHRODESIS  2012    C1-C2    BACK SURGERY Left 03/2022    left si joint fusion    CHOLECYSTECTOMY      COLONOSCOPY  09/12/2011    internal hemorrhoids    DILATION AND CURETTAGE OF UTERUS      HEMORRHOID SURGERY      HYSTERECTOMY (CERVIX STATUS UNKNOWN)      ovaries remain, age 32 yrs old    ROTATOR CUFF REPAIR Left     TEMPOROMANDIBULAR JOINT SURGERY Left     1990s    TUBAL LIGATION           Current Outpatient Medications:     Cholecalciferol (VITAMIN D3) 125 MCG (5000 UT) TABS, Take 1 tablet by mouth daily, Disp: , Rfl:     Omega-3 300 MG CAPS, Take 2 capsules by mouth daily, Disp: , Rfl:     Coenzyme Q10 (CO Q 10 PO), Take 1 tablet by mouth daily, Disp: , Rfl:     levothyroxine (SYNTHROID) 50 MCG tablet, Take 50 mcg by mouth daily, Disp: , Rfl:     lisinopril (PRINIVIL;ZESTRIL) 20 MG tablet, Take 20 mg by mouth daily, Disp: , Rfl:     Magnesium Gluconate 500 (27 Mg) MG TABS tablet, Take 500 mg by mouth daily, Disp: , Rfl:     Probiotic Product (PROBIOTIC DAILY PO), Take by mouth, Disp: , Rfl:     Allergies:  Patient has no known allergies. Social History:   Social History     Tobacco Use   Smoking Status Never   Smokeless Tobacco Never     Social History     Substance and Sexual Activity   Alcohol Use No         Family History:   Family History   Problem Relation Age of Onset    High Blood Pressure Mother        REVIEW OF SYSTEMS:    Constitutional: Negative. HENT: Negative. Eyes: Negative. Respiratory: Negative. Cardiovascular: Negative. Gastrointestinal: Negative. Genitourinary: Negative. Musculoskeletal:  Positive for back pain, joint pain and myalgias. Skin: Negative. Neurological:  Positive for weakness. Endo/Heme/Allergies: Negative. Psychiatric/Behavioral: Negative. Review of Systems was obtained by the medical assistant and reviewed by myself. PHYSICAL EXAM:    Vitals:    09/26/22 1145   BP: (!) 140/88   Pulse: 80   Resp: 18       Constitutional: Appears well-developed and well-nourished. Eyes - conjunctiva normal.  Pupils react to light  Ear, nose,throat -Normal pinna and tragus, No scars, or lesions over external nose or ears, no obvious atrophy of tongue  Neck- symmetric, trachea midline, no jugular vein distension  Respiration- chest wall symmetric, normal effort without use of accessory muscles  Musculoskeletal - no significant wasting of muscles noted, no bony deformities, symmetric bulk  Extremities- no clubbing, cyanosis or edema  Skin - warm, dry, and intact. No rash,erythema, or pallor.   Psychiatric - mood, affect, and behavior appear normal.       NEUROLOGIC EXAM:    MENTAL STATUS: Alert, oriented, thought content appropriate    CRANIAL NERVES: PERRL, EOMI, symmetric facies, tongue midline    MOTOR:     Right Upper Extremity:    Deltoid: 5/5  Biceps: 5/5  Triceps: 5/5  Wrist Extension: 5/5  Finger Abduction: 5/5      Left Upper Extremity:    Deltoid: 5/5  Biceps: 5/5  Triceps: 5/5  Wrist Extension: 5/5  Finger Abduction: 5/5      Right Lower Extremity:    Hip Flexion: 5/5  Knee Extension: 5/5  Ankle Plantarflexion: 5/5  Ankle Dorsiflexion: 5/5        Left Lower Extremity:    Hip Flexion: 5/5  Knee Extension: 5/5  Ankle Plantarflexion: 5/5  Ankle Dorsiflexion: 5/5      SOMATOSENSORY:     Right Upper Extremity: normal light touch sensation  Left Upper Extremity: normal light touch sensation  Right Lower Extremity: normal light touch sensation  Left Lower Extremity: normal light touch sensation      REFLEXES:    Biceps: 2+  Patella: 2+  Ankle Jerk: 1+    Puentes's: Negative      COORDINATION and GAIT:    Gait: Antalgic      PROVOCATIVE TESTS:    Straight Leg Raise: Negative on the left, weakly positive on the right (SI pain and buttock pain)  Ahmet-Finger test: Positive bilaterally  SI palpation: Strongly positive bilaterally  LIGIA: Positive bilaterally, R>L  Thigh Thrust: Positive on the right, negative on the left  Distraction: Weakly positive (non-radiating pain at site of palpation)      IMAGING:    My interpretation of imaging studies:     CT of the pelvis reviewed. There is no destructive or mass lesion. There are postsurgical changes after left SI fusion with two threaded screw-type implants. There is no evidence of hardware failure or lucency to suggest pseudoarthrosis. There is degenerative sclerosis of the right SI joint and mild bilateral hip DJD. ASSESSMENT AND PLAN:    This is a 69 y.o. female who returns to review her recent imaging and discuss her response to right SI joint injections. She had an excellent response to her first injection and reports >60% pain relief. She was able to increase her activity significantly after that injection. She has undergone a CT of her Pelvis that show appropriate postsurgical changes after her prior left SI fusion and evidence of degenerative changes in the right SI joint. She is not getting much benefit from physical therapy. I explained that given her good response to her previous SI fusion, her similar symptoms on the right side and her good response to her recent injection, I feel she is a good candidate for surgery (right SI fusion). The procedure was explained to her in detail using anatomic models. Specific risks of bleeding, infection, neurologic injury, implant misplacement, hardware failure, pseudoarthrosis and pelvic fracture were all discussed.   I also explained that this is a relatively new surgery at [facility] and that a proctoring surgeon would be present for the procedure. She voiced understanding and requested that we proceed with surgery. ADDENDUM:    Patient called to discuss the results of her injections with pain management. She states that both her first and second injection provided ~85% relief of the pain she was experiencing prior to the injection. She states that she was able to significantly increase her activity after the injections. She states that the result from these injections is similar to what she experienced prior to her previous left SI Fusion and that when she proceeded to surgery she had excellent relief of her pain. She is requesting that we proceed with right SI Fusion.       Freda Donis MD

## 2022-11-01 NOTE — ANESTHESIA PRE PROCEDURE
Department of Anesthesiology  Preprocedure Note       Name:  Darrius Walker   Age:  69 y.o.  :  1955                                          MRN:  [id number]         Date:  2022      Surgeon: Vannessa Solorzano):  Hira Medellin MD    Procedure: Procedure(s):  RIGHT SACROILIAC JOINT FUSION    Medications prior to admission:   Prior to Admission medications    Medication Sig Start Date End Date Taking?  Authorizing Provider   Cholecalciferol (VITAMIN D3) 125 MCG (5000 UT) TABS Take 1 tablet by mouth daily    Historical Provider, MD   Omega-3 300 MG CAPS Take 2 capsules by mouth daily    Historical Provider, MD   Coenzyme Q10 (CO Q 10 PO) Take 1 tablet by mouth daily    Historical Provider, MD   levothyroxine (SYNTHROID) 50 MCG tablet Take 50 mcg by mouth daily 8/3/22   Historical Provider, MD   lisinopril (PRINIVIL;ZESTRIL) 20 MG tablet Take 20 mg by mouth in the morning and at bedtime 22   Historical Provider, MD   Magnesium Gluconate 500 (27 Mg) MG TABS tablet Take 500 mg by mouth daily    Historical Provider, MD   Probiotic Product (PROBIOTIC DAILY PO) Take by mouth  Patient not taking: Reported on 2022    Historical Provider, MD       Current medications:    Current Facility-Administered Medications   Medication Dose Route Frequency Provider Last Rate Last Admin    ceFAZolin (ANCEF) 2,000 mg in sterile water 20 mL IV syringe  2,000 mg IntraVENous Once Hira Medellin MD           Allergies:  No Known Allergies    Problem List:    Patient Active Problem List   Diagnosis Code    Acquired hypothyroidism E03.9    Mixed hyperlipidemia E78.2    Avitaminosis D E55.9    Irritable bowel syndrome with both constipation and diarrhea K58.2    Arthralgia M25.50    Myalgia M79.10    BERTRAND (generalized anxiety disorder) F41.1    Class 1 obesity due to excess calories without serious comorbidity with body mass index (BMI) of 31.0 to 31.9 in adult E66.09, Z68.31    Fibromyalgia M79.7    Sacroiliac joint dysfunction of right side M53.3       Past Medical History:        Diagnosis Date    Allergic rhinitis     Alopecia     Cervical radiculopathy     Gastritis     Hypertension     IBS (irritable bowel syndrome)     Idiopathic insomnia     Internal hemorrhoids     Irregular heart beat     Irritable bowel syndrome with both constipation and diarrhea     Seborrheic dermatitis of scalp     Thyroid disease     Trochanteric bursitis of both hips     Venous insufficiency        Past Surgical History:        Procedure Laterality Date    ARTHRODESIS  2012    C1-C2    BACK SURGERY Left 03/2022    left si joint fusion    CHOLECYSTECTOMY      COLONOSCOPY  09/12/2011    internal hemorrhoids    DILATION AND CURETTAGE OF UTERUS      HEMORRHOID SURGERY      HYSTERECTOMY (CERVIX STATUS UNKNOWN)      ovaries remain, age 32 yrs old   [facility] ROTATOR CUFF REPAIR Left     TEMPOROMANDIBULAR JOINT SURGERY Left     1990s    TUBAL LIGATION         Social History:    Social History     Tobacco Use    Smoking status: Never    Smokeless tobacco: Never   Substance Use Topics    Alcohol use: No                                Counseling given: Not Answered      Vital Signs (Current):   Vitals:    11/01/22 0716   Pulse: 65   Resp: 18   Temp: 97.2 °F (36.2 °C)   TempSrc: Tympanic   SpO2: 98%   Weight: 172 lb (78 kg)   Height: 5' 3\" (1.6 m)                                              BP Readings from Last 3 Encounters:   09/26/22 (!) 157/81   09/26/22 (!) 140/88   09/22/22 (!) 160/80       NPO Status: Time of last liquid consumption: 1600                        Time of last solid consumption: 1600                        Date of last liquid consumption: 10/31/22                        Date of last solid food consumption: 10/31/22    BMI:   Wt Readings from Last 3 Encounters:   11/01/22 172 lb (78 kg)   10/28/22 174 lb (78.9 kg)   09/26/22 173 lb 0.2 oz (78.5 kg)     Body mass index is 30.47 kg/m².     CBC:   Lab Results   Component Value Date/Time    WBC 6.6 10/28/2022 02:20 PM    RBC 4.42 10/28/2022 02:20 PM    HGB 13.6 10/28/2022 02:20 PM    HCT 41.2 10/28/2022 02:20 PM    MCV 93.2 10/28/2022 02:20 PM    RDW 12.9 10/28/2022 02:20 PM     10/28/2022 02:20 PM       CMP:   Lab Results   Component Value Date/Time     10/28/2022 02:20 PM    K 3.9 10/28/2022 02:20 PM     10/28/2022 02:20 PM    CO2 27 10/28/2022 02:20 PM    BUN 16 10/28/2022 02:20 PM    CREATININE 0.7 10/28/2022 02:20 PM    LABGLOM >60 10/28/2022 02:20 PM    GLUCOSE 83 10/28/2022 02:20 PM    PROT 7.5 10/28/2022 02:20 PM    CALCIUM 10.1 10/28/2022 02:20 PM    BILITOT 0.3 10/28/2022 02:20 PM    ALKPHOS 90 10/28/2022 02:20 PM    AST 18 10/28/2022 02:20 PM    ALT 17 10/28/2022 02:20 PM       POC Tests: No results for input(s): POCGLU, POCNA, POCK, POCCL, POCBUN, POCHEMO, POCHCT in the last 72 hours.     Coags:   Lab Results   Component Value Date/Time    PROTIME 12.2 10/28/2022 02:20 PM    INR 0.92 10/28/2022 02:20 PM    APTT 27.6 10/28/2022 02:20 PM       HCG (If Applicable): No results found for: PREGTESTUR, PREGSERUM, HCG, HCGQUANT     ABGs: No results found for: PHART, PO2ART, LVO6TIF, UXT5EDW, BEART, T1UOEWWL     Type & Screen (If Applicable):  No results found for: LABABO, LABRH    Drug/Infectious Status (If Applicable):  No results found for: HIV, HEPCAB    COVID-19 Screening (If Applicable): No results found for: COVID19        Anesthesia Evaluation  Patient summary reviewed and Nursing notes reviewed no history of anesthetic complications:   Airway: Mallampati: II  TM distance: >3 FB   Neck ROM: full  Mouth opening: > = 3 FB   Dental:          Pulmonary:normal exam        (-) not a current smoker                           Cardiovascular:    (+) hypertension:, hyperlipidemia         Beta Blocker:  Not on Beta Blocker         Neuro/Psych:   (+) depression/anxiety    (-) seizures and CVA           GI/Hepatic/Renal:   (+) GERD: well controlled,      (-) liver disease and no renal disease       Endo/Other:    (+) hypothyroidism::., .    (-) diabetes mellitus               Abdominal:             Vascular: negative vascular ROS. Other Findings:           Anesthesia Plan      general     ASA 2       Induction: intravenous. MIPS: Postoperative opioids intended and Prophylactic antiemetics administered. Anesthetic plan and risks discussed with patient.                         Marline Arellano MD   11/1/2022

## 2022-11-14 ENCOUNTER — OFFICE VISIT (OUTPATIENT)
Dept: NEUROSURGERY | Age: 67
End: 2022-11-14

## 2022-11-14 VITALS
WEIGHT: 172 LBS | DIASTOLIC BLOOD PRESSURE: 80 MMHG | HEART RATE: 80 BPM | RESPIRATION RATE: 18 BRPM | SYSTOLIC BLOOD PRESSURE: 125 MMHG | HEIGHT: 63 IN | BODY MASS INDEX: 30.48 KG/M2

## 2022-11-14 DIAGNOSIS — M53.3 SACROILIAC JOINT DYSFUNCTION OF RIGHT SIDE: Primary | ICD-10-CM

## 2022-11-14 PROCEDURE — 99024 POSTOP FOLLOW-UP VISIT: CPT | Performed by: NEUROLOGICAL SURGERY

## 2022-11-14 ASSESSMENT — ENCOUNTER SYMPTOMS
GASTROINTESTINAL NEGATIVE: 1
EYES NEGATIVE: 1
RESPIRATORY NEGATIVE: 1

## 2022-11-14 NOTE — PROGRESS NOTES
NEUROSURGERY POSTOPERATIVE FOLLOW UP NOTE      Chief Complaint:   Chief Complaint   Patient presents with    Wound Check     Patient is here after sacroiliac joint fusion of the right side surgery and states that she has had more pain with this surgery than her left side. She states that she has some incision site pain with itching and burning as well pain where the pins were inserted. She denies numbness or tingling. She states she is getting strength back now. Date of Surgery:  11/1/2022    Procedure Performed:  Right sacroiliac joint fusion      Interval Update:  First follow-up visit after surgery. She is doing well. She reports that she had significant surgical site pain, and back pain as well as muscle spasms in her leg for the first week after surgery however these have been improving. She is ambulating with a walker. She denies any new radicular pain, numbness or lower-extremity weakness. Objective:    /80   Pulse 80   Resp 18   Ht 5' 3\" (1.6 m)   Wt 172 lb (78 kg)   BMI 30.47 kg/m²         Physical Exam:    General: alert, cooperative, no distress  Cardiorespiratory: unlabored breathing  Wound: C/D/I with dermabond, no swelling, erythema or fluctuance      Neurologic Exam:    Mental Status: Alert, oriented, thought content appropriate  Cranial Nerves: PERRL, EOMI, symmetric facies, tongue midline  Motor: Motor exam is symmetrical 5 out of 5 all extremities bilaterally  Somatosensory: normal light touch sensation          Assessment and Plan:    69 y.o. F returns for her first follow-up visit after right sacroiliac joint fusion on 11/1/2022 after having prior left-sided SI fusion with another surgeon. She is doing well and following the expected postoperative course. I encouraged her to increase her activity and weight-bearing as tolerated but to continue to avoid bending, lifting and twisting.   She is weaning off her pain medication and I will plan to see her again in six weeks.         Electronically signed by Rossana Duque MD on 11/14/2022 at 11:33 AM

## 2022-11-14 NOTE — PROGRESS NOTES
Review of Systems   Constitutional: Negative. HENT: Negative. Eyes: Negative. Respiratory: Negative. Cardiovascular: Negative. Gastrointestinal: Negative. Genitourinary: Negative. Musculoskeletal:  Positive for joint pain and myalgias. Skin: Negative. Neurological:  Positive for weakness. Endo/Heme/Allergies: Negative. Psychiatric/Behavioral: Negative.

## 2022-12-08 ENCOUNTER — TELEPHONE (OUTPATIENT)
Dept: OTOLARYNGOLOGY | Facility: CLINIC | Age: 67
End: 2022-12-08

## 2023-01-04 ENCOUNTER — OFFICE VISIT (OUTPATIENT)
Dept: NEUROSURGERY | Age: 68
End: 2023-01-04

## 2023-01-04 VITALS
BODY MASS INDEX: 30.48 KG/M2 | HEART RATE: 70 BPM | DIASTOLIC BLOOD PRESSURE: 80 MMHG | SYSTOLIC BLOOD PRESSURE: 135 MMHG | HEIGHT: 63 IN | WEIGHT: 172 LBS | RESPIRATION RATE: 18 BRPM

## 2023-01-04 DIAGNOSIS — M53.3 SACROILIAC JOINT DYSFUNCTION OF RIGHT SIDE: Primary | ICD-10-CM

## 2023-01-04 PROCEDURE — 99024 POSTOP FOLLOW-UP VISIT: CPT | Performed by: NEUROLOGICAL SURGERY

## 2023-01-04 ASSESSMENT — ENCOUNTER SYMPTOMS
GASTROINTESTINAL NEGATIVE: 1
RESPIRATORY NEGATIVE: 1
EYES NEGATIVE: 1

## 2023-01-04 NOTE — PROGRESS NOTES
NEUROSURGERY POSTOPERATIVE FOLLOW UP NOTE      Chief Complaint:   Chief Complaint   Patient presents with    Follow-up     Patient is here to follow up after surgery and states that she has improved a lot since last visit. She states that her gait is still an issue but still improving. She is somewhat concerned about her gait. She has been doing stretches and pushing her self movements that is helping. Numbness     Patient denies numbness but states she does have weakness. Date of Surgery:  11/1/2022    Procedure Performed:  Right sacroiliac joint fusion      Interval Update:  Planned follow-up visit after surgery. She is doing well. Her back and hip pain continues to improve. She is now walking without assistive devices. She is doing home exercises as well. Objective:    /80   Pulse 70   Resp 18   Ht 5' 3\" (1.6 m)   Wt 172 lb (78 kg)   BMI 30.47 kg/m²         Physical Exam:    General: alert, cooperative, no distress  Cardiorespiratory: unlabored breathing  Wound: C/D/I, healing well. No swelling, erythema or fluctuance. Neurologic Exam:    Mental Status: Alert, oriented, thought content appropriate  Cranial Nerves: PERRL, EOMI, symmetric facies, tongue midline  Motor: Motor exam is symmetrical 5 out of 5 all extremities bilaterally  Somatosensory: normal light touch sensation          Assessment and Plan:    69 y.o. F returns for a planned follow-up visit after right sacroiliac joint fusion on 11/1/2022 after having prior left-sided SI fusion with another surgeon. She is doing well. I encouraged her to continue to increase her activity as tolerated and that she may gradually increase her lifting, bending and twisting but she should continues to avoid activities that cause pain. I will plan to see her again in three months with new x-rays.         Electronically signed by Ava Gu MD on 1/4/2023 at 11:28 AM

## 2023-01-16 ENCOUNTER — OFFICE VISIT (OUTPATIENT)
Dept: OTOLARYNGOLOGY | Facility: CLINIC | Age: 68
End: 2023-01-16
Payer: MEDICARE

## 2023-01-16 VITALS
HEART RATE: 80 BPM | HEIGHT: 63 IN | TEMPERATURE: 98 F | WEIGHT: 177 LBS | BODY MASS INDEX: 31.36 KG/M2 | DIASTOLIC BLOOD PRESSURE: 91 MMHG | RESPIRATION RATE: 16 BRPM | SYSTOLIC BLOOD PRESSURE: 142 MMHG

## 2023-01-16 DIAGNOSIS — S03.00XD DISLOCATION OF TEMPOROMANDIBULAR JOINT, SUBSEQUENT ENCOUNTER: ICD-10-CM

## 2023-01-16 DIAGNOSIS — K21.9 LARYNGOPHARYNGEAL REFLUX (LPR): Primary | ICD-10-CM

## 2023-01-16 DIAGNOSIS — R09.82 POST-NASAL DRAINAGE: ICD-10-CM

## 2023-01-16 DIAGNOSIS — H92.01 EAR PAIN, RIGHT: ICD-10-CM

## 2023-01-16 PROCEDURE — 99203 OFFICE O/P NEW LOW 30 MIN: CPT | Performed by: NURSE PRACTITIONER

## 2023-01-16 RX ORDER — NICOTINE POLACRILEX 4 MG/1
20 GUM, CHEWING ORAL 2 TIMES DAILY
Qty: 168 EACH | Refills: 4 | Status: SHIPPED | OUTPATIENT
Start: 2023-01-16

## 2023-01-16 RX ORDER — LEVOTHYROXINE SODIUM 0.05 MG/1
TABLET ORAL
COMMUNITY
Start: 2022-10-30

## 2023-01-16 RX ORDER — FEXOFENADINE HCL 180 MG/1
180 TABLET ORAL DAILY
COMMUNITY
Start: 2023-01-05

## 2023-01-16 RX ORDER — MAGNESIUM GLUCONATE 27 MG(500)
500 TABLET ORAL
COMMUNITY
End: 2023-01-16

## 2023-01-16 RX ORDER — FLUTICASONE PROPIONATE 50 MCG
SPRAY, SUSPENSION (ML) NASAL
COMMUNITY
Start: 2023-01-05

## 2023-01-16 RX ORDER — AZELASTINE HYDROCHLORIDE 137 UG/1
SPRAY, METERED NASAL
COMMUNITY
Start: 2023-01-05

## 2023-01-16 RX ORDER — UBIDECARENONE 60 MG
1 CAPSULE ORAL DAILY
COMMUNITY

## 2023-01-16 RX ORDER — LISINOPRIL 20 MG/1
20 TABLET ORAL 2 TIMES DAILY
COMMUNITY
Start: 2022-10-28

## 2023-01-23 ENCOUNTER — TELEPHONE (OUTPATIENT)
Dept: OTOLARYNGOLOGY | Facility: CLINIC | Age: 68
End: 2023-01-23

## 2023-01-26 ENCOUNTER — TELEPHONE (OUTPATIENT)
Dept: NEUROSURGERY | Age: 68
End: 2023-01-26

## 2023-01-26 NOTE — TELEPHONE ENCOUNTER
Patient called and stated that she is having increased back and hip pain. She states that the pains is somewhat worse than before surgery. Her  recently had surgery by Dr Tre Finley and is coming to office 2/1/23. She would like appt at same time to see Dr Rita Lowry for this increased pain. Appt made and pt melony.

## 2023-02-06 RX ORDER — ALLANTOIN 5 MG/G
50 GEL TOPICAL 3 TIMES DAILY PRN
Qty: 50 G | Refills: 1 | Status: SHIPPED | OUTPATIENT
Start: 2023-02-06 | End: 2023-03-08

## 2023-04-05 ENCOUNTER — OFFICE VISIT (OUTPATIENT)
Dept: NEUROSURGERY | Age: 68
End: 2023-04-05
Payer: MEDICARE

## 2023-04-05 ENCOUNTER — HOSPITAL ENCOUNTER (OUTPATIENT)
Dept: GENERAL RADIOLOGY | Age: 68
Discharge: HOME | End: 2023-04-05
Payer: MEDICARE

## 2023-04-05 VITALS
WEIGHT: 172 LBS | DIASTOLIC BLOOD PRESSURE: 90 MMHG | RESPIRATION RATE: 18 BRPM | HEIGHT: 63 IN | SYSTOLIC BLOOD PRESSURE: 150 MMHG | BODY MASS INDEX: 30.48 KG/M2 | HEART RATE: 80 BPM

## 2023-04-05 DIAGNOSIS — M53.3 SACROILIAC JOINT DYSFUNCTION OF RIGHT SIDE: ICD-10-CM

## 2023-04-05 DIAGNOSIS — M53.3 SACROILIAC JOINT DYSFUNCTION OF RIGHT SIDE: Primary | ICD-10-CM

## 2023-04-05 DIAGNOSIS — M53.3 SACROILIAC JOINT DYSFUNCTION OF LEFT SIDE: ICD-10-CM

## 2023-04-05 PROCEDURE — 1036F TOBACCO NON-USER: CPT | Performed by: NEUROLOGICAL SURGERY

## 2023-04-05 PROCEDURE — 1123F ACP DISCUSS/DSCN MKR DOCD: CPT | Performed by: NEUROLOGICAL SURGERY

## 2023-04-05 PROCEDURE — 72202 X-RAY EXAM SI JOINTS 3/> VWS: CPT

## 2023-04-05 PROCEDURE — 72202 X-RAY EXAM SI JOINTS 3/> VWS: CPT | Performed by: RADIOLOGY

## 2023-04-05 PROCEDURE — G8427 DOCREV CUR MEDS BY ELIG CLIN: HCPCS | Performed by: NEUROLOGICAL SURGERY

## 2023-04-05 PROCEDURE — 1090F PRES/ABSN URINE INCON ASSESS: CPT | Performed by: NEUROLOGICAL SURGERY

## 2023-04-05 PROCEDURE — 99213 OFFICE O/P EST LOW 20 MIN: CPT | Performed by: NEUROLOGICAL SURGERY

## 2023-04-05 PROCEDURE — 3017F COLORECTAL CA SCREEN DOC REV: CPT | Performed by: NEUROLOGICAL SURGERY

## 2023-04-05 PROCEDURE — G8400 PT W/DXA NO RESULTS DOC: HCPCS | Performed by: NEUROLOGICAL SURGERY

## 2023-04-05 PROCEDURE — G8417 CALC BMI ABV UP PARAM F/U: HCPCS | Performed by: NEUROLOGICAL SURGERY

## 2023-04-05 ASSESSMENT — ENCOUNTER SYMPTOMS
BACK PAIN: 1
GASTROINTESTINAL NEGATIVE: 1
RESPIRATORY NEGATIVE: 1
EYES NEGATIVE: 1

## 2023-04-05 NOTE — PROGRESS NOTES
Review of Systems   Constitutional: Negative. HENT: Negative. Eyes: Negative. Respiratory: Negative. Cardiovascular: Negative. Gastrointestinal: Negative. Genitourinary: Negative. Musculoskeletal:  Positive for back pain, joint pain and myalgias. Skin: Negative. Neurological: Negative. Endo/Heme/Allergies: Negative. Psychiatric/Behavioral: Negative.
a planned follow-up visit after right sacroiliac joint fusion on 11/1/2022 after having prior left-sided SI fusion with another surgeon. She is doing well after her surgery from my point of view. Her right sided sacroiliac pain has nearly-completely resolved. She continues to have considerable left-sided sacroiliac pain, though no obvious issues are noted on her x-rays. I recommended that she schedule a visit with pain management to discuss her ongoing left-sided sacroiliac pain. I will plan to see her again in six months with new x-rays.         Electronically signed by Gerson Marie MD on 4/5/2023 at 11:26 AM

## 2023-08-23 ENCOUNTER — TELEPHONE (OUTPATIENT)
Dept: NEUROSURGERY | Age: 68
End: 2023-08-23

## 2023-08-23 ENCOUNTER — HOSPITAL ENCOUNTER (OUTPATIENT)
Dept: PAIN MANAGEMENT | Age: 68
Discharge: HOME | End: 2023-08-23
Payer: MEDICARE

## 2023-08-23 VITALS
SYSTOLIC BLOOD PRESSURE: 137 MMHG | DIASTOLIC BLOOD PRESSURE: 79 MMHG | BODY MASS INDEX: 32.07 KG/M2 | HEART RATE: 78 BPM | RESPIRATION RATE: 16 BRPM | HEIGHT: 63 IN | TEMPERATURE: 97.6 F | WEIGHT: 181 LBS | OXYGEN SATURATION: 99 %

## 2023-08-23 DIAGNOSIS — M54.42 CHRONIC LEFT-SIDED LOW BACK PAIN WITH LEFT-SIDED SCIATICA: ICD-10-CM

## 2023-08-23 DIAGNOSIS — G89.29 CHRONIC LEFT-SIDED LOW BACK PAIN WITH LEFT-SIDED SCIATICA: ICD-10-CM

## 2023-08-23 DIAGNOSIS — M43.28: ICD-10-CM

## 2023-08-23 DIAGNOSIS — M54.16 LUMBAR RADICULOPATHY: ICD-10-CM

## 2023-08-23 PROCEDURE — 99213 OFFICE O/P EST LOW 20 MIN: CPT | Performed by: NURSE PRACTITIONER

## 2023-08-23 PROCEDURE — 99213 OFFICE O/P EST LOW 20 MIN: CPT

## 2023-08-23 RX ORDER — MAGNESIUM OXIDE/MAG AA CHELATE 300 MG
CAPSULE ORAL
COMMUNITY

## 2023-08-23 ASSESSMENT — PAIN DESCRIPTION - ORIENTATION: ORIENTATION: LEFT

## 2023-08-23 ASSESSMENT — PAIN SCALES - GENERAL: PAINLEVEL_OUTOF10: 6

## 2023-08-23 ASSESSMENT — PAIN DESCRIPTION - LOCATION: LOCATION: BUTTOCKS;FOOT;LEG

## 2023-08-23 ASSESSMENT — PAIN DESCRIPTION - PAIN TYPE: TYPE: CHRONIC PAIN

## 2023-08-23 NOTE — TELEPHONE ENCOUNTER
Patient came into office after her PM appt. She states that Emmie at PM stated that Dr Milton Hayes needs to review old imaging of her back and order new back imaging. Patient states that she is having increased back pain after SI surgery. Right SI joint feels great but back pain is radiating down legs causing numbness. Appt scheduled with Dr Milton Hayes and pt ISABELA.

## 2023-08-24 PROBLEM — M54.40 CHRONIC LEFT-SIDED LOW BACK PAIN WITH SCIATICA: Status: ACTIVE | Noted: 2023-08-24

## 2023-08-24 PROBLEM — M54.16 LUMBAR RADICULOPATHY: Status: ACTIVE | Noted: 2023-08-24

## 2023-08-24 PROBLEM — M43.28: Status: ACTIVE | Noted: 2023-08-24

## 2023-08-24 PROBLEM — G89.29 CHRONIC LEFT-SIDED LOW BACK PAIN WITH SCIATICA: Status: ACTIVE | Noted: 2023-08-24

## 2023-08-24 NOTE — PROGRESS NOTES
Clinic Documentation      Education Provided:  [x] Review of Jarret Limon  [] Agreement Review  [x] PEG Score Calculated [] PHQ Score Calculated [] ORT Score Calculated    [] Compliance Issues Discussed [] Cognitive Behavior Needs [x] Exercise [] Review of Test [] Financial Issues  [x] Tobacco/Alcohol Use Reviewed [x] Teaching [] New Patient [] Picture Obtained    Physician Plan:  [] Outgoing Referral  [] Pharmacy Consult  [] Test Ordered [] Prescription Ordered/Changed   [] Obtained Test Results / Consult Notes        Complete if patient is withholding blood thinner for procedure     Blood Thinner Patient is currently taking:      [] Plavix (Hold for 7 days)  [] Aspirin (Hold for 5 days)     [] Pletal (Hold for 2 days)  [] Pradaxa (Hold for 3 days)    [] Effient (Hold for 7 days)  [] Xarelto (Hold for 2 days)    [] Eliquis (Hold for 2 days)  [] Brilinta (Hold for 7 days)    [] Coumadin (Hold for 5 days) - (INR needs to be drawn the day prior to procedure- INR < 2.0)    [] Aggrenox (Hold for 7 days)        [] Patient will stop medication on their own.    [] Blood Thinner Form Faxed for approval to hold.    Provider form faxed to:     Assessment Completed by:  Electronically signed by Erwin Nicolas RN on 8/23/2023 at 3:32 PM
MG tablet Take 1 tablet by mouth 2 times daily Pt takes 40mg total 1xd      Magnesium Gluconate 500 (27 Mg) MG TABS tablet Take 1 tablet by mouth daily (Patient not taking: Reported on 8/23/2023)      Probiotic Product (PROBIOTIC DAILY PO) Take by mouth (Patient not taking: Reported on 11/1/2022)       No current facility-administered medications for this encounter. Social History    Social History     Tobacco Use    Smoking status: Never    Smokeless tobacco: Never   Substance Use Topics    Alcohol use: No         Family History  family history includes High Blood Pressure in her mother. Review of Systems:  Constitutional: denies fever, chills, fatigue, change in appetite, weight gain or weight loss  Head: Normocephalic  Skin: denies easy bruising, skin redness, skin rash, hives, sensitivity to sun exposure, tightness, nodules or bumps, hair loss, color changes in the hands or feet, or feeling of temperature change such as coldness  Eyes: denies pain, redness, loss of vision, double or blurred vision, eye drainage, or dryness. ENT and Mouth: denies ringing in the ears, loss of hearing, nasal congestion, nasal discharge, no hoarseness, sore throat, or difficulty swallowing   Respiratory: denies chronic dry cough, coughing up blood, coughing up mucus, waking at night coughing or choking, or wheezing  Cardiovascular: denies chest pain, irregular heartbeats, palpitations, shortness of breath, or edema in legs  Gastrointestinal: denies, nausea, vomiting, heartburn, diarrhea, constipation  Genitourinary: denies difficult urination, pain or burning with urination, blood in the urine, or cloudy urine  Musculoskeletal: denies arm, buttock, thigh or calf cramps. Has pain in left low back that goes down back of left leg to foot. No muscle weakness. No joint swelling. Neurologic: headache, dizziness, fainting, loss of consciousness, nno sensitivity, no memory loss. .    Endocrine: denies intolerance to hot or cold

## 2023-08-30 ENCOUNTER — OFFICE VISIT (OUTPATIENT)
Dept: NEUROSURGERY | Age: 68
End: 2023-08-30
Payer: MEDICARE

## 2023-08-30 VITALS
WEIGHT: 181 LBS | SYSTOLIC BLOOD PRESSURE: 130 MMHG | RESPIRATION RATE: 18 BRPM | HEART RATE: 77 BPM | BODY MASS INDEX: 32.07 KG/M2 | DIASTOLIC BLOOD PRESSURE: 88 MMHG | HEIGHT: 63 IN

## 2023-08-30 DIAGNOSIS — M53.3 SACROILIAC JOINT DYSFUNCTION OF RIGHT SIDE: Primary | ICD-10-CM

## 2023-08-30 DIAGNOSIS — Z98.1 S/P FUSION OF SACROILIAC JOINT: ICD-10-CM

## 2023-08-30 DIAGNOSIS — M54.16 LUMBAR RADICULOPATHY: ICD-10-CM

## 2023-08-30 DIAGNOSIS — M53.3 SACROILIAC JOINT DYSFUNCTION OF LEFT SIDE: ICD-10-CM

## 2023-08-30 PROCEDURE — 1036F TOBACCO NON-USER: CPT | Performed by: NEUROLOGICAL SURGERY

## 2023-08-30 PROCEDURE — 1123F ACP DISCUSS/DSCN MKR DOCD: CPT | Performed by: NEUROLOGICAL SURGERY

## 2023-08-30 PROCEDURE — G8417 CALC BMI ABV UP PARAM F/U: HCPCS | Performed by: NEUROLOGICAL SURGERY

## 2023-08-30 PROCEDURE — G8400 PT W/DXA NO RESULTS DOC: HCPCS | Performed by: NEUROLOGICAL SURGERY

## 2023-08-30 PROCEDURE — 99214 OFFICE O/P EST MOD 30 MIN: CPT | Performed by: NEUROLOGICAL SURGERY

## 2023-08-30 PROCEDURE — 1090F PRES/ABSN URINE INCON ASSESS: CPT | Performed by: NEUROLOGICAL SURGERY

## 2023-08-30 PROCEDURE — G8427 DOCREV CUR MEDS BY ELIG CLIN: HCPCS | Performed by: NEUROLOGICAL SURGERY

## 2023-08-30 PROCEDURE — 3017F COLORECTAL CA SCREEN DOC REV: CPT | Performed by: NEUROLOGICAL SURGERY

## 2023-08-30 ASSESSMENT — ENCOUNTER SYMPTOMS
RESPIRATORY NEGATIVE: 1
GASTROINTESTINAL NEGATIVE: 1
EYES NEGATIVE: 1
BACK PAIN: 1

## 2023-08-30 NOTE — PROGRESS NOTES
Review of Systems   Constitutional: Negative. HENT: Negative. Eyes: Negative. Respiratory: Negative. Cardiovascular: Negative. Gastrointestinal: Negative. Genitourinary: Negative. Musculoskeletal:  Positive for back pain, joint pain and myalgias. Skin: Negative. Neurological:  Positive for tingling and weakness. Endo/Heme/Allergies: Negative. Psychiatric/Behavioral: Negative.
midline  Motor: Motor exam is symmetrical 5 out of 5 all extremities bilaterally  Somatosensory: normal light touch sensation      Imaging:    No new imaging      Assessment and Plan:    77 y.o. F returns for an unplanned follow-up visit after right sacroiliac joint fusion on 11/1/2022 after having prior left-sided SI fusion with another surgeon. She has done well from the perspective of her right SI fusion, with resolution of her SI pain. Unfortunately, she continues to have considerable pain in her left buttock and leg. At this point, the differential is fairly broad and may include implant related problems or pseudoarthrosis related to her prior SI fusion surgery, lumbar radiculopathy related to a herniated disc or foraminal stenosis or piriformis syndrome. I recommended that we obtain updated imaging, including a CT scan of her pelvis to assess the implants and fusion status of her left SI joint and I also recommended an updated lumbar MRI. I will plan to follow up with her when the imaging is  completed.         Electronically signed by Kate Decker MD on 8/30/2023 at 9:41 AM

## 2023-09-18 ENCOUNTER — OFFICE VISIT (OUTPATIENT)
Dept: NEUROSURGERY | Age: 68
End: 2023-09-18
Payer: MEDICARE

## 2023-09-18 VITALS
RESPIRATION RATE: 18 BRPM | SYSTOLIC BLOOD PRESSURE: 142 MMHG | HEART RATE: 80 BPM | WEIGHT: 181 LBS | HEIGHT: 63 IN | DIASTOLIC BLOOD PRESSURE: 80 MMHG | BODY MASS INDEX: 32.07 KG/M2

## 2023-09-18 DIAGNOSIS — M53.3 SACROILIAC JOINT DYSFUNCTION OF LEFT SIDE: ICD-10-CM

## 2023-09-18 DIAGNOSIS — M53.3 SACROILIAC JOINT DYSFUNCTION OF RIGHT SIDE: ICD-10-CM

## 2023-09-18 DIAGNOSIS — Z98.1 S/P FUSION OF SACROILIAC JOINT: Primary | ICD-10-CM

## 2023-09-18 PROCEDURE — G8417 CALC BMI ABV UP PARAM F/U: HCPCS | Performed by: NEUROLOGICAL SURGERY

## 2023-09-18 PROCEDURE — 1123F ACP DISCUSS/DSCN MKR DOCD: CPT | Performed by: NEUROLOGICAL SURGERY

## 2023-09-18 PROCEDURE — 3017F COLORECTAL CA SCREEN DOC REV: CPT | Performed by: NEUROLOGICAL SURGERY

## 2023-09-18 PROCEDURE — 99214 OFFICE O/P EST MOD 30 MIN: CPT | Performed by: NEUROLOGICAL SURGERY

## 2023-09-18 PROCEDURE — 1036F TOBACCO NON-USER: CPT | Performed by: NEUROLOGICAL SURGERY

## 2023-09-18 PROCEDURE — G8427 DOCREV CUR MEDS BY ELIG CLIN: HCPCS | Performed by: NEUROLOGICAL SURGERY

## 2023-09-18 PROCEDURE — G8400 PT W/DXA NO RESULTS DOC: HCPCS | Performed by: NEUROLOGICAL SURGERY

## 2023-09-18 PROCEDURE — 1090F PRES/ABSN URINE INCON ASSESS: CPT | Performed by: NEUROLOGICAL SURGERY

## 2023-09-18 ASSESSMENT — ENCOUNTER SYMPTOMS
EYES NEGATIVE: 1
GASTROINTESTINAL NEGATIVE: 1
BACK PAIN: 1
RESPIRATORY NEGATIVE: 1

## 2023-12-12 ENCOUNTER — HOSPITAL ENCOUNTER (OUTPATIENT)
Dept: PAIN MANAGEMENT | Age: 68
Discharge: HOME | End: 2023-12-12
Payer: MEDICARE

## 2023-12-12 VITALS
HEIGHT: 63 IN | DIASTOLIC BLOOD PRESSURE: 88 MMHG | BODY MASS INDEX: 32.25 KG/M2 | RESPIRATION RATE: 16 BRPM | WEIGHT: 182 LBS | OXYGEN SATURATION: 96 % | HEART RATE: 75 BPM | SYSTOLIC BLOOD PRESSURE: 160 MMHG | TEMPERATURE: 96.8 F

## 2023-12-12 DIAGNOSIS — Z02.83 ENCOUNTER FOR DRUG SCREENING: ICD-10-CM

## 2023-12-12 DIAGNOSIS — G57.02 PIRIFORMIS SYNDROME OF LEFT SIDE: ICD-10-CM

## 2023-12-12 DIAGNOSIS — M54.16 LUMBAR RADICULOPATHY: ICD-10-CM

## 2023-12-12 DIAGNOSIS — M79.18 MYOFASCIAL MUSCLE PAIN: Primary | ICD-10-CM

## 2023-12-12 PROCEDURE — 99214 OFFICE O/P EST MOD 30 MIN: CPT

## 2023-12-12 ASSESSMENT — ENCOUNTER SYMPTOMS
BACK PAIN: 1
EYES NEGATIVE: 1
BOWEL INCONTINENCE: 0
GASTROINTESTINAL NEGATIVE: 1
RESPIRATORY NEGATIVE: 1

## 2023-12-12 ASSESSMENT — PAIN DESCRIPTION - PAIN TYPE: TYPE: CHRONIC PAIN

## 2023-12-12 ASSESSMENT — PAIN DESCRIPTION - LOCATION: LOCATION: HIP

## 2023-12-12 ASSESSMENT — PAIN SCALES - GENERAL: PAINLEVEL_OUTOF10: 3

## 2023-12-12 NOTE — PROGRESS NOTES
OFFICE NOTE    Patient Name: Tonie Aceves    MR #: [id number]    Account [de-identified]    : 1955    Age: 77 y.o. Sex: female    Date: 2023    PCP: Michaela Bonilla    Chief Complaint:   Chief Complaint   Patient presents with    Back Pain       History of Present Illness: The patient is a 77 y.o. female who presents to the office for a follow up back pain. Patient has had Bilateral SI Joint Fusions. Patient had Left done by Dr. Julieta Elkins in [place] and the Right by Dr. Lobito Triana. Patient has been post op greater than 1 year from surgeries and continues to have pain in her low back that radiates down into left buttocks and around into left groin/thigh. Patient also reports muscle tightness to left thigh. Patient continues the home exercise program and has for greater than 6 weeks at least 3 times weekly or as tolerated. Patient is also following with Podiatry and plans to have surgery on her left foot. Back Pain  This is a chronic problem. The current episode started more than 1 year ago. The problem occurs constantly. The problem has been waxing and waning since onset. The pain is present in the lumbar spine, sacro-iliac and gluteal. The quality of the pain is described as aching and cramping. The pain radiates to the left thigh. The pain is at a severity of 5/10. The pain is moderate. The pain is The same all the time. The symptoms are aggravated by bending, position, standing and twisting. Stiffness is present In the morning. Pertinent negatives include no bladder incontinence, bowel incontinence, numbness or weakness. She has tried heat, home exercises and ice for the symptoms. The treatment provided mild relief. Does pain affect ADLs Yes laundry, housework, and walking  Does pain affect quality of life? Yes  Does pain affect activities of enjoyment? Yes  Have you been on pain medication for your pain?  Yes  If so, does the pain medication keep your pain tolerable to perform the tasks that

## 2023-12-12 NOTE — PROGRESS NOTES
Clinic Documentation      Education Provided:  [x] Review of Jennifer Lob  [] Agreement Review  [x] PEG Score Calculated [] PHQ Score Calculated [] ORT Score Calculated    [] Compliance Issues Discussed [] Cognitive Behavior Needs [x] Exercise [] Review of Test [] Financial Issues  [x] Tobacco/Alcohol Use Reviewed [x] Teaching [] New Patient [] Picture Obtained    Physician Plan:  [] Outgoing Referral  [] Pharmacy Consult  [] Test Ordered [x] Prescription Ordered/Changed   [] Obtained Test Results / Consult Notes        Complete if patient is withholding blood thinner for procedure     Blood Thinner Patient is currently taking:      [] Plavix (Hold for 7 days)  [] Aspirin (Hold for 5 days)     [] Pletal (Hold for 2 days)  [] Pradaxa (Hold for 3 days)    [] Effient (Hold for 7 days)  [] Xarelto (Hold for 2 days)    [] Eliquis (Hold for 2 days)  [] Brilinta (Hold for 7 days)    [] Coumadin (Hold for 5 days) - (INR needs to be drawn the day prior to procedure- INR < 2.0)    [] Aggrenox (Hold for 7 days)        [] Patient will stop medication on their own.    [] Blood Thinner Form Faxed for approval to hold.    Provider form faxed to:     Assessment Completed by:  Electronically signed by Luz Méndez MA on 12/12/2023 at 8:45 AM

## 2024-01-02 ENCOUNTER — TELEPHONE (OUTPATIENT)
Dept: NEUROSURGERY | Age: 69
End: 2024-01-02

## 2024-01-02 NOTE — TELEPHONE ENCOUNTER
Patient called stating that she had a cervical fusion several years ago at another facility, she went to a chiropractor for an adjustment recently and states she is now having some problems. She is c/o dizziness, nausea, frequent headaches, all worsened by bending over and sudden position changes. Patient is also c/o BUE pain stating the LT is worse than the RT and it extends all the way down to her wrists, again worse on LT than RT. Patient has previously seen Dr Goodson for an SI Joint Fusion, she has not seen you or Dr Evasn personally, she states she was so impressed w/the treatment that her  received and the outcome of his previous surgery that she would like your opinion and to know how to proceed as well as if you would be willing to see her as a patient to help address what she currently has going on w/her neck and upper extremities. Patient did state she has no issues w/the care she received from Dr Goodson and would continue to remain established w/him regarding her SI Joint procedure, she is asking if you/Dr Evans would be ok or willing to see her for the cervical spine issues that she feels she is having now due to the experience she encountered while her  was being cared for in our office. Please advise further.

## 2024-01-04 NOTE — TELEPHONE ENCOUNTER
It is kind of odd to go back and forth between providers of the same practice for something that Dr. Goodson could take care of.  I would run this by Dr. Goodson as well to see if he is okay with it and if so, we will be happy to see her.

## 2024-01-04 NOTE — TELEPHONE ENCOUNTER
I returned call to patient and stated I could go ahead and schedule her an appt w/Hina if that is what she wishes to do. Patient stated she has seen her pcp for this issue as well and decided to put any further appts on hold until she completed the PT/Vestibular Rehab that was ordered by him. I v/u and stated that if she still has issues after completing therapy to give us a call back and we will be happy to get her scheduled. Patient thanked me for calling and v/u.

## 2024-01-09 ENCOUNTER — HOSPITAL ENCOUNTER (OUTPATIENT)
Dept: PAIN MANAGEMENT | Age: 69
Discharge: HOME | End: 2024-01-09
Payer: MEDICARE

## 2024-01-09 VITALS
RESPIRATION RATE: 18 BRPM | TEMPERATURE: 97.1 F | SYSTOLIC BLOOD PRESSURE: 150 MMHG | DIASTOLIC BLOOD PRESSURE: 73 MMHG | HEART RATE: 70 BPM | OXYGEN SATURATION: 100 %

## 2024-01-09 PROCEDURE — 2500000003 HC RX 250 WO HCPCS

## 2024-01-09 PROCEDURE — 6360000002 HC RX W HCPCS

## 2024-01-09 PROCEDURE — 20553 NJX 1/MLT TRIGGER POINTS 3/>: CPT

## 2024-01-09 RX ORDER — BUPIVACAINE HYDROCHLORIDE 5 MG/ML
15 INJECTION, SOLUTION EPIDURAL; INTRACAUDAL ONCE
Status: DISCONTINUED | OUTPATIENT
Start: 2024-01-09 | End: 2024-01-11 | Stop reason: HOSPADM

## 2024-01-09 RX ORDER — LIDOCAINE HYDROCHLORIDE 10 MG/ML
15 INJECTION, SOLUTION EPIDURAL; INFILTRATION; INTRACAUDAL; PERINEURAL ONCE
Status: DISCONTINUED | OUTPATIENT
Start: 2024-01-09 | End: 2024-01-11 | Stop reason: HOSPADM

## 2024-01-09 NOTE — INTERVAL H&P NOTE
Update History & Physical    The patient's History and Physical  was reviewed with the patient and I examined the patient. There was no change. The surgical site was confirmed by the patient and me.     Plan: The risks, benefits, expected outcome, and alternative to the recommended procedure have been discussed with the patient. Patient understands and wants to proceed with the procedure.     Electronically signed by Celso Burks MD on 1/9/2024 at 12:00 PM

## 2024-01-09 NOTE — PROGRESS NOTES
Procedure:  Level of Consciousness: [x]Alert []Oriented []Disoriented []Lethargic  Anxiety Level: [x]Calm []Anxious []Depressed []Other  Skin: []Warm [x]Dry []Cool []Moist []Intact []Other  Cardiovascular: [x]Palpitations: [x]Never []Occasionally []Frequently  Chest Pain: [x]No []Yes  Respiratory:  [x]Unlabored []Labored []Cough ([] Productive []Unproductive)  HCG Required: [x]No []Yes   Results: []Negative []Positive  Knowledge Level:        [x]Patient/Other verbalized understanding of pre-procedure instructions.        [x]Assessment of post-op care needs (transportation, responsible caregiver)        [x]Able to discuss health care problems and how to deal with it.  Factors that Affect Teaching:        Language Barrier: [x]No []Yes - why:        Hearing Loss:        [x]No []Yes            Corrective Device:  []Yes []No        Vision Loss:           [x]No []Yes            Corrective Device:  []Yes []No        Memory Loss:       [x]No []Yes            []Short Term []Long Term  Motivational Level:  [x]Asks Questions                  []Extremely Anxious       [x]Seems Interested               []Seems Uninterested                  [x]Denies need for Education  Risk for Injury:  [x]Patient oriented to person, place and time  []History of frequent falls/loss of balance  Nutritional:  []Change in appetite   []Weight Gain   []Weight Loss  Functional:  []Requires assistance with ADL's

## 2024-08-20 ENCOUNTER — OFFICE VISIT (OUTPATIENT)
Dept: NEUROSURGERY | Facility: CLINIC | Age: 69
End: 2024-08-20
Payer: MEDICARE

## 2024-08-20 ENCOUNTER — HOSPITAL ENCOUNTER (OUTPATIENT)
Dept: GENERAL RADIOLOGY | Facility: HOSPITAL | Age: 69
Discharge: HOME | End: 2024-08-20
Admitting: PHYSICIAN ASSISTANT
Payer: MEDICARE

## 2024-08-20 VITALS — HEIGHT: 63 IN | BODY MASS INDEX: 31.15 KG/M2 | WEIGHT: 175.8 LBS

## 2024-08-20 DIAGNOSIS — M47.816 LUMBAR SPONDYLOSIS: ICD-10-CM

## 2024-08-20 DIAGNOSIS — M51.36 DDD (DEGENERATIVE DISC DISEASE), LUMBAR: ICD-10-CM

## 2024-08-20 DIAGNOSIS — E66.09 CLASS 1 OBESITY DUE TO EXCESS CALORIES WITHOUT SERIOUS COMORBIDITY WITH BODY MASS INDEX (BMI) OF 31.0 TO 31.9 IN ADULT: ICD-10-CM

## 2024-08-20 DIAGNOSIS — M47.816 LUMBAR SPONDYLOSIS: Primary | ICD-10-CM

## 2024-08-20 DIAGNOSIS — R20.2 PARESTHESIAS: ICD-10-CM

## 2024-08-20 DIAGNOSIS — Z78.9 NONSMOKER: ICD-10-CM

## 2024-08-20 PROBLEM — M51.369 DDD (DEGENERATIVE DISC DISEASE), LUMBAR: Status: ACTIVE | Noted: 2024-08-20

## 2024-08-20 PROBLEM — E66.811 CLASS 1 OBESITY DUE TO EXCESS CALORIES WITHOUT SERIOUS COMORBIDITY WITH BODY MASS INDEX (BMI) OF 31.0 TO 31.9 IN ADULT: Status: ACTIVE | Noted: 2024-08-20

## 2024-08-20 PROCEDURE — 99205 OFFICE O/P NEW HI 60 MIN: CPT | Performed by: PHYSICIAN ASSISTANT

## 2024-08-20 PROCEDURE — 72110 X-RAY EXAM L-2 SPINE 4/>VWS: CPT

## 2024-08-20 RX ORDER — LISINOPRIL 10 MG/1
10 TABLET ORAL DAILY
COMMUNITY

## 2024-08-20 RX ORDER — MAGNESIUM OXIDE/MAG AA CHELATE 300 MG
CAPSULE ORAL
COMMUNITY

## 2024-11-22 ENCOUNTER — OFFICE VISIT (OUTPATIENT)
Dept: NEUROSURGERY | Facility: CLINIC | Age: 69
End: 2024-11-22
Payer: MEDICARE

## 2024-11-22 VITALS — HEIGHT: 63 IN | WEIGHT: 175 LBS | BODY MASS INDEX: 31.01 KG/M2

## 2024-11-22 DIAGNOSIS — E66.09 CLASS 1 OBESITY DUE TO EXCESS CALORIES WITH SERIOUS COMORBIDITY AND BODY MASS INDEX (BMI) OF 31.0 TO 31.9 IN ADULT: ICD-10-CM

## 2024-11-22 DIAGNOSIS — Z78.9 NON-SMOKER: Primary | ICD-10-CM

## 2024-11-22 DIAGNOSIS — M54.2 CERVICALGIA: ICD-10-CM

## 2024-11-22 DIAGNOSIS — E66.811 CLASS 1 OBESITY DUE TO EXCESS CALORIES WITH SERIOUS COMORBIDITY AND BODY MASS INDEX (BMI) OF 31.0 TO 31.9 IN ADULT: ICD-10-CM

## 2024-11-22 NOTE — PROGRESS NOTES
"MRI cervical spine follow-up 8 weeks    Chief complaint:   Chief Complaint   Patient presents with    Follow-up     3 month follow up low back and left sciatic pain, feet numb        Subjective     HPI: I had a chance to see Jessica today in follow-up and to review her imaging studies of lumbar spine.  She does have multilevel degenerative disc disease however I do not see any severe stenosis either in the foramen or in the central canal which would need surgical decompression and there is no evidence of instability.  She does have facet arthropathy and is currently getting facet injections with rhizotomies and recently had the rhizotomy portion and we are waiting for the final outcome on this as this only occurred 2 days ago and she is still feeling the burning from the rhizotomy itself.  She is having a significant amount of pain between the shoulder blades and she does have a history of a previous cervical fusion and pain around the shoulder blades.  Given this I do think it would be worthwhile to get an MRI of her cervical spine as she has tried physical therapy for this to try to get rid of the shoulder blade pain and nothing is helped.    Review of Systems      Objective      Vital Signs  Ht 160 cm (62.99\")   Wt 79.4 kg (175 lb)   BMI 31.01 kg/m²     Physical Exam  Psychiatric:         Speech: Speech normal.         Neurological Exam  Mental Status  Awake, alert and oriented to person, place and time. Oriented to person, place and time. Speech is normal. Language is fluent with no aphasia. Attention and concentration are normal. Fund of knowledge is appropriate for level of education.    Gait  Casual gait is normal including stance, stride, and arm swing.       Imaging review:   MRI of the lumbar spine does show multilevel degenerative disc disease which is moderate in nature however there is no severe foraminal or central canal stenosis which would need surgical decompression.  She does have more advanced " degeneration at L1/2 and to a lesser degree L2/3 than the other areas of the spine.        Assessment/Plan:   Multilevel degenerative disc disease  Cervical pain with radiation to the shoulder blade    Given the fact that Jessica has had some facet rhizotomies I would like to see how she does from these procedures and we will also get a new MRI of her cervical spine as she has a history of previous neck surgery and now has some arm pain and shoulder blade pain that is not responding to physical therapy.  We will order the new MRI of her cervical spine and she will follow-up with me once that is complete.  I look forward to seeing her at her next visit.    Patient is a nonsmoker  The patient's Body mass index is 31.01 kg/m².. BMI is above normal parameters. Recommendations include: continue with current weight loss program    Diagnoses and all orders for this visit:    1. Non-smoker (Primary)    2. Class 1 obesity due to excess calories with serious comorbidity and body mass index (BMI) of 31.0 to 31.9 in adult    3. Cervicalgia  -     MRI Cervical Spine Without Contrast; Future        I discussed the patients findings and my recommendations with patient  Nain Mariee DO  11/22/24  11:59 CST

## 2025-01-24 ENCOUNTER — TELEPHONE (OUTPATIENT)
Dept: NEUROSURGERY | Facility: CLINIC | Age: 70
End: 2025-01-24
Payer: MEDICARE

## 2025-01-24 NOTE — TELEPHONE ENCOUNTER
mailbox full called to r/s cancelled appt     It is okay for the hub to relay the message to the patient and schedule if possible

## (undated) DEVICE — AGENT HEMSTAT 8ML FLX TIP MTRX + DISP SURGIFLO

## (undated) DEVICE — GLOVE SURG SZ 75 L12IN FNGR THK79MIL GRN LTX FREE

## (undated) DEVICE — MINOR CDS: Brand: MEDLINE INDUSTRIES, INC.

## (undated) DEVICE — GLOVE SURG SZ 75 L12IN FNGR THK94MIL TRNSLUC YEL LTX

## (undated) DEVICE — DRAPE THYROID-SOFT: Brand: MEDLINE INDUSTRIES, INC.

## (undated) DEVICE — E-Z CLEAN, NON-STICK, PTFE COATED, ELECTROSURGICAL BLADE ELECTRODE, MODIFIED EXTENDED INSULATION, 2.5 INCH (6.35 CM): Brand: MEGADYNE

## (undated) DEVICE — HYPODERMIC SAFETY NEEDLE: Brand: MAGELLAN

## (undated) DEVICE — TUBE ET 7MM NSL ORAL BASIC CUF INTMED MURPHY EYE RADPQ MRK

## (undated) DEVICE — STAPLER, SKIN, 35W, A: Brand: MEDLINE INDUSTRIES, INC.

## (undated) DEVICE — C-ARMOR C-ARM EQUIPMENT COVERS CLEAR STERILE UNIVERSAL FIT 12 PER CASE: Brand: C-ARMOR

## (undated) DEVICE — ELECTROSURGICAL PENCIL BUTTON SWITCH NON COATED BLADE ELECTRODE 10 FT (3 M) CORD HOLSTER: Brand: MEGADYNE

## (undated) DEVICE — LARYNGOSCOPE EXAM MAC 2 ALL MTL BLDE VIVID LED AND HNDL DISP

## (undated) DEVICE — SPK10281 JACKSON TABLE KIT: Brand: SPK10281 JACKSON TABLE KIT

## (undated) DEVICE — CATHETER IV 18GA L1.16IN OD1.308MM ID0.978MM GRN VIALON

## (undated) DEVICE — SUTURE VCRL SZ 2-0 L27IN ABSRB VLT L26MM UR-6 5/8 CIR J602H

## (undated) DEVICE — SUTURE VCRL SZ 2-0 L18IN ABSRB UD CT-1 L36MM 1/2 CIR J839D

## (undated) DEVICE — C-ARM: Brand: UNBRANDED

## (undated) DEVICE — PACK,UNIVERSAL,NO GOWNS: Brand: MEDLINE

## (undated) DEVICE — BAG BND W36XL36IN TRNSPAR POLY GEN PURP W E BND CLSR TIDI

## (undated) DEVICE — 3M™ IOBAN™ 2 ANTIMICROBIAL INCISE DRAPE 6650EZ: Brand: IOBAN™ 2

## (undated) DEVICE — ADHESIVE SKIN CLSR 0.7ML TOP DERMBND ADV